# Patient Record
Sex: FEMALE | Race: WHITE | NOT HISPANIC OR LATINO | Employment: FULL TIME | URBAN - METROPOLITAN AREA
[De-identification: names, ages, dates, MRNs, and addresses within clinical notes are randomized per-mention and may not be internally consistent; named-entity substitution may affect disease eponyms.]

---

## 2019-11-19 ENCOUNTER — OFFICE VISIT (OUTPATIENT)
Dept: INTERNAL MEDICINE | Facility: CLINIC | Age: 29
End: 2019-11-19

## 2019-11-19 VITALS
BODY MASS INDEX: 21.62 KG/M2 | TEMPERATURE: 98.7 F | HEART RATE: 88 BPM | HEIGHT: 69 IN | DIASTOLIC BLOOD PRESSURE: 94 MMHG | SYSTOLIC BLOOD PRESSURE: 142 MMHG | WEIGHT: 146 LBS

## 2019-11-19 DIAGNOSIS — M50.90 CERVICAL DISC DISEASE: ICD-10-CM

## 2019-11-19 DIAGNOSIS — Z00.00 ENCOUNTER FOR MEDICAL EXAMINATION TO ESTABLISH CARE: ICD-10-CM

## 2019-11-19 DIAGNOSIS — L70.0 CYSTIC ACNE: ICD-10-CM

## 2019-11-19 DIAGNOSIS — F90.2 ATTENTION DEFICIT HYPERACTIVITY DISORDER (ADHD), COMBINED TYPE: Primary | ICD-10-CM

## 2019-11-19 DIAGNOSIS — Z79.899 LONG-TERM USE OF HIGH-RISK MEDICATION: ICD-10-CM

## 2019-11-19 PROBLEM — M40.202 CERVICAL KYPHOSIS: Status: ACTIVE | Noted: 2019-11-19

## 2019-11-19 PROBLEM — B00.9 HERPES SIMPLEX TYPE 1 INFECTION: Status: ACTIVE | Noted: 2019-11-19

## 2019-11-19 PROCEDURE — 90471 IMMUNIZATION ADMIN: CPT | Performed by: NURSE PRACTITIONER

## 2019-11-19 PROCEDURE — 99214 OFFICE O/P EST MOD 30 MIN: CPT | Performed by: NURSE PRACTITIONER

## 2019-11-19 PROCEDURE — 90674 CCIIV4 VAC NO PRSV 0.5 ML IM: CPT | Performed by: NURSE PRACTITIONER

## 2019-11-19 RX ORDER — DOXYCYCLINE HYCLATE 50 MG/1
1 CAPSULE ORAL DAILY PRN
COMMUNITY
Start: 2019-11-18 | End: 2019-11-19

## 2019-11-19 RX ORDER — SPIRONOLACTONE 100 MG/1
100 TABLET, FILM COATED ORAL DAILY
Qty: 30 TABLET | Refills: 2 | Status: SHIPPED | OUTPATIENT
Start: 2019-11-19 | End: 2020-02-19

## 2019-11-19 RX ORDER — DEXTROAMPHETAMINE SACCHARATE, AMPHETAMINE ASPARTATE MONOHYDRATE, DEXTROAMPHETAMINE SULFATE AND AMPHETAMINE SULFATE 7.5; 7.5; 7.5; 7.5 MG/1; MG/1; MG/1; MG/1
30 CAPSULE, EXTENDED RELEASE ORAL DAILY
Qty: 30 CAPSULE | Refills: 0 | Status: SHIPPED | OUTPATIENT
Start: 2019-11-19 | End: 2019-12-19 | Stop reason: SDUPTHER

## 2019-11-19 RX ORDER — BUPROPION HYDROCHLORIDE 150 MG/1
1 TABLET ORAL DAILY
COMMUNITY
Start: 2019-11-16 | End: 2020-02-26 | Stop reason: SDUPTHER

## 2019-11-19 RX ORDER — LINACLOTIDE 290 UG/1
1 CAPSULE, GELATIN COATED ORAL EVERY MORNING
COMMUNITY
Start: 2019-11-13 | End: 2019-12-23 | Stop reason: SDUPTHER

## 2019-11-19 RX ORDER — ETONOGESTREL/ETHINYL ESTRADIOL .12-.015MG
RING, VAGINAL VAGINAL
Refills: 2 | COMMUNITY
Start: 2019-10-17 | End: 2021-01-26 | Stop reason: SDUPTHER

## 2019-11-19 RX ORDER — DEXTROAMPHETAMINE SACCHARATE, AMPHETAMINE ASPARTATE MONOHYDRATE, DEXTROAMPHETAMINE SULFATE AND AMPHETAMINE SULFATE 7.5; 7.5; 7.5; 7.5 MG/1; MG/1; MG/1; MG/1
1 CAPSULE, EXTENDED RELEASE ORAL DAILY
Refills: 0 | COMMUNITY
Start: 2019-10-17 | End: 2019-11-19 | Stop reason: SDUPTHER

## 2019-11-19 RX ORDER — ACYCLOVIR 800 MG/1
1 TABLET ORAL EVERY 8 HOURS PRN
COMMUNITY
Start: 2019-11-18 | End: 2020-01-27 | Stop reason: SDUPTHER

## 2019-11-19 NOTE — PROGRESS NOTES
"Selma Rangel  1990  [id number]  Patient Care Team:  Shaila Cole APRN as PCP - General (Internal Medicine)    Selma Rangel is a 28 y.o. here today to establish care.    Chief Complaint   Patient presents with   • Establish Care     Transferring care from Dr. Taylor at [facility]        HPI:   Here to establish care.  Was seeing Dr. Taylor at [facility].  Had to change due insurance.  Has lived in [place] for about 5 years.  Grew up in [place].  While  lived in [place].    ADHD:  On adderall 30mg XR daily for years.  Only uses on work days, works full time at  [employer]. Hyperactive, has to play with silly putty when not busy.  On meds since age 15 (concerta), had side effects of nausea.  Have had some periods without when uninsured.    Past saw psych, history of alcoholic parent and depression after divorce and trouble with infidelity, was on sertraline but had side effect of low libido, the wellburtin was added and then zoloft removed.    MDD:  Divorce about a yr ago, on Wellbutrin 150mg and when inc to 300mg had irritability.  Back to 150mg this month.      Acne:  Uses doxy as spironolactone.  not used for a while, had good results and would like to restart.. Past saw Yon romano.    Chronic constipation:  Good results with Linzess.  Had normal colonoscopy about 3 yrs ago in [place].  Still has mild sx of \"colon spasm\" which is not new.  Better with Linzess.    GYN:    Last PAP Dec 2017 normal (never abnormal)  No past pregnancies.  No period early Oct.  Has boyfriend now.  NuvaRing for contraception,   Put in most recent neuvaring early.  Has not taken a home pregnancy test.    Herpes Simplex 1: prn use of zovirax    About a year ago had a work related neck sprain.  During evaluation and treatment had CT imaging with evidence of disc disease, slight kyphosis and cervical levoscoliosis.  She brings a copy of the MRI for review.  She has had chronic neck pain for years prior to the " strain which continues.  uses ibuprophen prn, multiple times a week  Past Medical History:   Diagnosis Date   • ADHD (attention deficit hyperactivity disorder) 1/23/1997    Was tested as a child because I would struggle in school   • Arthritis 11/12/2018   • Depression    • Irritable bowel syndrome      Past Surgical History:   Procedure Laterality Date   • COLONOSCOPY       Family History   Problem Relation Age of Onset   • Depression Mother    • Miscarriages / Stillbirths Mother    • Depression Sister      Social History     Tobacco Use   Smoking Status Never Smoker   Smokeless Tobacco Never Used   Tobacco Comment    Never used it. But my boyfriend does sometimes smoke around me.     No Known Allergies    Current Outpatient Medications:   •  acyclovir (ZOVIRAX) 800 MG tablet, Take 1 tablet by mouth Every 8 (Eight) Hours As Needed (cold sores)., Disp: , Rfl:   •  amphetamine-dextroamphetamine XR (ADDERALL XR) 30 MG 24 hr capsule, Take 1 capsule by mouth Daily, Disp: , Rfl: 0  •  buPROPion XL (WELLBUTRIN XL) 150 MG 24 hr tablet, Take 1 tablet by mouth Daily., Disp: , Rfl:   •  LINZESS 290 MCG capsule capsule, Take 1 capsule by mouth Every Morning., Disp: , Rfl:   •  NUVARING 0.12-0.015 MG/24HR vaginal ring, INSERT ONE RING VAGINALLY FOR THREE WEEKS THEN ONE WEEK OFF, Disp: , Rfl: 2  •  spironolactone (ALDACTONE) 100 MG tablet, Take 1 tablet by mouth Daily., Disp: 30 tablet, Rfl: 2    Review of Systems   Constitutional: Positive for fatigue. Negative for chills and fever.   HENT: Negative for congestion, ear pain and sinus pressure.    Respiratory: Negative for cough, chest tightness, shortness of breath and wheezing.    Cardiovascular: Negative for chest pain and palpitations.   Gastrointestinal: Negative for abdominal pain, blood in stool and constipation.   Musculoskeletal: Positive for neck pain.   Skin: Negative for color change.   Allergic/Immunologic: Positive for environmental allergies.   Neurological:  "Negative for dizziness, speech difficulty and headache.   Psychiatric/Behavioral: Negative for decreased concentration. The patient is not nervous/anxious.        /94 (BP Location: Left arm, Patient Position: Sitting, Cuff Size: Adult)   Pulse 88   Temp 98.7 °F (37.1 °C) (Temporal)   Ht 175 cm (68.9\")   Wt 66.2 kg (146 lb)   BMI 21.62 kg/m²     Physical Exam   Constitutional: She appears well-developed and well-nourished.   HENT:   Head: Normocephalic and atraumatic.   Right Ear: External ear normal.   Left Ear: External ear normal.   Mouth/Throat: Oropharynx is clear and moist.   Eyes: Conjunctivae and EOM are normal.   Neck: Normal range of motion. Neck supple.   Cardiovascular: Normal rate, regular rhythm and normal heart sounds.   Pulmonary/Chest: Effort normal and breath sounds normal.   Musculoskeletal: Normal range of motion.   Neurological: She is alert.   Skin: Skin is warm and dry.   Psychiatric: She has a normal mood and affect. Her behavior is normal. Thought content normal. She is inattentive.       Results Review:  None    Assessment/Plan:  Problem List Items Addressed This Visit        Musculoskeletal and Integument    Cervical disc disease    Relevant Orders    Ambulatory Referral to Pain Management    Cystic acne       Other    Attention deficit hyperactivity disorder (ADHD), combined type - Primary    Relevant Medications    buPROPion XL (WELLBUTRIN XL) 150 MG 24 hr tablet    amphetamine-dextroamphetamine XR (ADDERALL XR) 30 MG 24 hr capsule      Other Visit Diagnoses     Encounter for medical examination to establish care        Long-term use of high-risk medication        Relevant Orders    Pain Management Profile (13 Drugs) Urine - Urine, Clean Catch            Diagnosis Plan   1. Attention deficit hyperactivity disorder (ADHD), combined type      Continue Adderall 30 mg daily   2. Encounter for medical examination to establish care     3. Long-term use of high-risk medication  Pain " Management Profile (13 Drugs) Urine - Urine, Clean Catch   4. Cervical disc disease  Ambulatory Referral to Pain Management   5. Cystic acne      Sending spironolactone prescription       Patient Instructions   We will proceed with home pregnancy test this week and next.  If any positive readings return for serum pregnancy test.    Will refill Adderall.  This patient is on a controlled substance which improves symptoms/quality of life and is aware of the risks, benefits and possible side-effects current treatment. The patient denies any medication side-effects at this time. A controlled substance agreement will be obtained or is currently on file. We reviewed required monitoring for controlled substances including but not limited to quarterly follow-up visits, annual depression screening, and urine drug screens to which the patient is agreeable. A [facility]A report has been or shortly will be reviewed. There are no signs of deviation or misuse.     Referral to Dr. Winter for neck pain.    Restart spironolactone 100 mg daily for acne.    Plan of care reviewed with patient at the conclusion of today's visit. Education was provided regarding diagnosis and management.  Patient verbalizes understanding of and agreement with management plan.    Return in about 3 months (around 2/19/2020).    * Please note that portions of this note were completed with a voice recognition program. Efforts were made to edit the dictation but occasionally words are transcribed.     MARK Greco

## 2019-11-19 NOTE — PATIENT INSTRUCTIONS
We will proceed with home pregnancy test this week and next.  If any positive readings return for serum pregnancy test.    Will refill Adderall.  This patient is on a controlled substance which improves symptoms/quality of life and is aware of the risks, benefits and possible side-effects current treatment. The patient denies any medication side-effects at this time. A controlled substance agreement will be obtained or is currently on file. We reviewed required monitoring for controlled substances including but not limited to quarterly follow-up visits, annual depression screening, and urine drug screens to which the patient is agreeable. A MAYA report has been or shortly will be reviewed. There are no signs of deviation or misuse.     Referral to Dr. Winter for neck pain.    Restart spironolactone 100 mg daily for acne.

## 2019-11-27 DIAGNOSIS — Z79.899 LONG-TERM USE OF HIGH-RISK MEDICATION: ICD-10-CM

## 2019-12-19 RX ORDER — DEXTROAMPHETAMINE SACCHARATE, AMPHETAMINE ASPARTATE MONOHYDRATE, DEXTROAMPHETAMINE SULFATE AND AMPHETAMINE SULFATE 7.5; 7.5; 7.5; 7.5 MG/1; MG/1; MG/1; MG/1
30 CAPSULE, EXTENDED RELEASE ORAL DAILY
Qty: 30 CAPSULE | Refills: 0 | Status: SHIPPED | OUTPATIENT
Start: 2019-12-19 | End: 2020-01-22 | Stop reason: SDUPTHER

## 2020-01-22 DIAGNOSIS — F90.2 ATTENTION DEFICIT HYPERACTIVITY DISORDER (ADHD), COMBINED TYPE: Primary | ICD-10-CM

## 2020-01-22 RX ORDER — LINACLOTIDE 290 UG/1
290 CAPSULE, GELATIN COATED ORAL EVERY MORNING
Qty: 30 CAPSULE | Refills: 5 | OUTPATIENT
Start: 2020-01-22

## 2020-01-22 RX ORDER — DEXTROAMPHETAMINE SACCHARATE, AMPHETAMINE ASPARTATE MONOHYDRATE, DEXTROAMPHETAMINE SULFATE AND AMPHETAMINE SULFATE 7.5; 7.5; 7.5; 7.5 MG/1; MG/1; MG/1; MG/1
30 CAPSULE, EXTENDED RELEASE ORAL DAILY
Qty: 30 CAPSULE | Refills: 0 | OUTPATIENT
Start: 2020-01-22

## 2020-01-22 RX ORDER — DEXTROAMPHETAMINE SACCHARATE, AMPHETAMINE ASPARTATE MONOHYDRATE, DEXTROAMPHETAMINE SULFATE AND AMPHETAMINE SULFATE 7.5; 7.5; 7.5; 7.5 MG/1; MG/1; MG/1; MG/1
30 CAPSULE, EXTENDED RELEASE ORAL DAILY
Qty: 30 CAPSULE | Refills: 0 | Status: SHIPPED | OUTPATIENT
Start: 2020-01-22 | End: 2020-02-26 | Stop reason: SDUPTHER

## 2020-01-22 NOTE — TELEPHONE ENCOUNTER
Last seen                                                                                                                                                                                                                                                                                                                                                                                                                                                                                           Last seen 11/19, next appt 2/17, last filled 12/19, #30, figueroa current

## 2020-02-19 RX ORDER — SPIRONOLACTONE 100 MG/1
100 TABLET, FILM COATED ORAL DAILY
Qty: 30 TABLET | Refills: 2 | Status: SHIPPED | OUTPATIENT
Start: 2020-02-19 | End: 2020-09-21 | Stop reason: SDUPTHER

## 2020-02-25 ENCOUNTER — TELEPHONE (OUTPATIENT)
Dept: INTERNAL MEDICINE | Facility: CLINIC | Age: 30
End: 2020-02-25

## 2020-02-25 DIAGNOSIS — F90.2 ATTENTION DEFICIT HYPERACTIVITY DISORDER (ADHD), COMBINED TYPE: ICD-10-CM

## 2020-02-25 RX ORDER — DEXTROAMPHETAMINE SACCHARATE, AMPHETAMINE ASPARTATE MONOHYDRATE, DEXTROAMPHETAMINE SULFATE AND AMPHETAMINE SULFATE 7.5; 7.5; 7.5; 7.5 MG/1; MG/1; MG/1; MG/1
30 CAPSULE, EXTENDED RELEASE ORAL DAILY
Qty: 30 CAPSULE | Refills: 0 | OUTPATIENT
Start: 2020-02-25

## 2020-02-25 NOTE — TELEPHONE ENCOUNTER
[facility] Patient-Entered Hpi Selection Questionnaire     Question 2/25/2020 10:29 AM EST   What is the primary reason for your visit? Other   Please describe your symptoms. Checking in. The last bill for my blood test was $300. And I don’t have extra money for that! So if i can find a cheaper option? Also, id like to try Venlafaxine. I have alot of the same depression and anxiety problems she has and I am wondering if because we are related it would effect me the same. The welbutron isnt working for me. Also, my body is in pain constantly and i never saw anyone for my neck due to budgeting issues.   Have you had these symptoms before? Yes   How long have you been having these symptoms? For more than a week   Please list any medications you are currently taking for this condition. They should all be in [facility] system. I dont feel like typing them out.   Please describe any probable cause for these symptoms.  I keep crying and i get social anxiety and its been causing issues between my boyfriend and i bc i am just not happy at all   Message     This message was automatically generated when an appointment dated 2/26/2020 was cancelled.      The cancelled appointment contained the following questionnaire data:      Patient Questionnaire Submission   --------------------------------      Questionnaire: Primary Reason for Visit      Question: What is the primary reason for your visit?   Answer:   Other      Questionnaire: Other      Question: Please describe your symptoms.   Answer:   Checking in. The last bill for my blood test was $300. And I don’t have extra money for that! So if i can find a cheaper option? Also, id like to try Venlafaxine. I have alot of the same depression and anxiety problems she has and I am wondering if because we are related it would effect me the same. The welbutron isnt working for me. Also, my body is in pain constantly and i never saw anyone for my neck due to budgeting issues.      Question:  Have you had these symptoms before?   Answer:   Yes      Question: How long have you been having these symptoms?   Answer:   For more than a week      Question: Please list any medications you are currently taking for this condition.   Answer:   They should all be in [facility] system. I dont feel like typing them out.      Question: Please describe any probable cause for these symptoms.    Answer:   I keep crying and i get social anxiety and its been causing issues between my boyfriend and i bc i am just not happy at all

## 2020-02-25 NOTE — TELEPHONE ENCOUNTER
Last filled: 1/22/20  Last seen:11/19/19  Next appointment: not scheduled (canceled tomorrows appointment)  MAYA:pending

## 2020-02-26 ENCOUNTER — OFFICE VISIT (OUTPATIENT)
Dept: INTERNAL MEDICINE | Facility: CLINIC | Age: 30
End: 2020-02-26

## 2020-02-26 VITALS
HEART RATE: 76 BPM | WEIGHT: 148 LBS | DIASTOLIC BLOOD PRESSURE: 92 MMHG | HEIGHT: 69 IN | TEMPERATURE: 98.7 F | SYSTOLIC BLOOD PRESSURE: 126 MMHG | BODY MASS INDEX: 21.92 KG/M2

## 2020-02-26 DIAGNOSIS — F90.2 ATTENTION DEFICIT HYPERACTIVITY DISORDER (ADHD), COMBINED TYPE: ICD-10-CM

## 2020-02-26 DIAGNOSIS — F33.1 MODERATE EPISODE OF RECURRENT MAJOR DEPRESSIVE DISORDER (HCC): Primary | ICD-10-CM

## 2020-02-26 PROCEDURE — 99214 OFFICE O/P EST MOD 30 MIN: CPT | Performed by: NURSE PRACTITIONER

## 2020-02-26 RX ORDER — BUPROPION HYDROCHLORIDE 150 MG/1
150 TABLET ORAL DAILY
Qty: 90 TABLET | Refills: 0 | Status: SHIPPED | OUTPATIENT
Start: 2020-02-26 | End: 2020-12-18 | Stop reason: SDUPTHER

## 2020-02-26 RX ORDER — VENLAFAXINE HYDROCHLORIDE 37.5 MG/1
37.5 CAPSULE, EXTENDED RELEASE ORAL DAILY
Qty: 90 CAPSULE | Refills: 0 | Status: SHIPPED | OUTPATIENT
Start: 2020-02-26 | End: 2020-05-22

## 2020-02-26 RX ORDER — DEXTROAMPHETAMINE SACCHARATE, AMPHETAMINE ASPARTATE MONOHYDRATE, DEXTROAMPHETAMINE SULFATE AND AMPHETAMINE SULFATE 7.5; 7.5; 7.5; 7.5 MG/1; MG/1; MG/1; MG/1
30 CAPSULE, EXTENDED RELEASE ORAL DAILY
Qty: 30 CAPSULE | Refills: 0 | Status: SHIPPED | OUTPATIENT
Start: 2020-02-26 | End: 2020-03-21 | Stop reason: SDUPTHER

## 2020-02-26 NOTE — PROGRESS NOTES
Selma Rangel  1990  [id number]  Patient Care Team:  Shaila Cole APRN as PCP - General (Internal Medicine)    Selma Rangel is a pleasant 29 y.o. female who presents for evaluation of ADHD (3 month follow up ) and Depression      Chief Complaint   Patient presents with   • ADHD     3 month follow up    • Depression       HPI:   More depression past few months.  Beginning to affect her relationship with her boyfriend. Crying more and higher anxiety.  Friends at work on noticing.  She has more social anxiety recently.  She has previously done well on Wellbutrin 300 mg and is not sure if she should go back up or we should trial another medication.  Her sister has similar symptoms and uses venlafaxine which she has considered.  The only past medication she is used are Wellbutrin and Zoloft.  Zoloft was effective but she had sexual side effects.    ADHD: Doing well on Adderall for years, this helps with focus and attention.  No side effects.  Past Medical History:   Diagnosis Date   • ADHD (attention deficit hyperactivity disorder) 1/23/1997    Was tested as a child because I would struggle in school   • Arthritis 11/12/2018   • Depression    • Irritable bowel syndrome      Past Surgical History:   Procedure Laterality Date   • COLONOSCOPY       Family History   Problem Relation Age of Onset   • Depression Mother    • Miscarriages / Stillbirths Mother    • Depression Sister      Social History     Tobacco Use   Smoking Status Never Smoker   Smokeless Tobacco Never Used   Tobacco Comment    Never used it. But my boyfriend does sometimes smoke around me.     No Known Allergies    Current Outpatient Medications:   •  acyclovir (ZOVIRAX) 800 MG tablet, Take 1 tablet by mouth 3 (Three) Times a Day. (Patient taking differently: Take 800 mg by mouth 3 (Three) Times a Day As Needed.), Disp: 15 tablet, Rfl: 5  •  buPROPion XL (WELLBUTRIN XL) 150 MG 24 hr tablet, Take 1 tablet by mouth Daily., Disp: 90 tablet, Rfl:  "0  •  LINZESS 290 MCG capsule capsule, Take 1 capsule by mouth Every Morning., Disp: 30 capsule, Rfl: 5  •  NUVARING 0.12-0.015 MG/24HR vaginal ring, INSERT ONE RING VAGINALLY FOR THREE WEEKS THEN ONE WEEK OFF, Disp: , Rfl: 2  •  spironolactone (ALDACTONE) 100 MG tablet, TAKE 1 TABLET BY MOUTH DAILY, Disp: 30 tablet, Rfl: 2  •  amphetamine-dextroamphetamine XR (ADDERALL XR) 30 MG 24 hr capsule, Take 1 capsule by mouth Daily, Disp: 30 capsule, Rfl: 0  •  venlafaxine XR (EFFEXOR-XR) 37.5 MG 24 hr capsule, Take 1 capsule by mouth Daily., Disp: 90 capsule, Rfl: 0    Review of Systems   Constitutional: Negative for chills, fatigue and fever.   HENT: Negative for congestion, ear pain and sinus pressure.    Respiratory: Negative for cough, chest tightness, shortness of breath and wheezing.    Cardiovascular: Negative for chest pain and palpitations.   Gastrointestinal: Negative for abdominal pain, blood in stool and constipation.   Skin: Negative for color change.   Allergic/Immunologic: Negative for environmental allergies.   Neurological: Negative for dizziness, speech difficulty and headache.   Psychiatric/Behavioral: Negative for decreased concentration. The patient is not nervous/anxious.      /92 (BP Location: Right arm, Patient Position: Sitting, Cuff Size: Adult)   Pulse 76   Temp 98.7 °F (37.1 °C) (Temporal)   Ht 175 cm (68.9\")   Wt 67.1 kg (148 lb)   BMI 21.92 kg/m²     Physical Exam   Constitutional: She appears well-developed and well-nourished.   HENT:   Head: Normocephalic and atraumatic.   Right Ear: External ear normal.   Left Ear: External ear normal.   Mouth/Throat: Oropharynx is clear and moist.   Eyes: Conjunctivae and EOM are normal.   Neck: Normal range of motion. Neck supple.   Cardiovascular: Normal rate, regular rhythm and normal heart sounds.   Pulmonary/Chest: Effort normal and breath sounds normal.   Musculoskeletal: Normal range of motion.   Neurological: She is alert.   Skin: Skin " is warm and dry.   Psychiatric: Her speech is normal and behavior is normal. Judgment and thought content normal. Cognition and memory are normal. She exhibits a depressed mood.   Denies homicidal or suicidal ideation       Results Review:  None    Assessment/Plan:  Selma was seen today for adhd and depression.    Diagnoses and all orders for this visit:    Moderate episode of recurrent major depressive disorder (CMS/[facility])  Comments:  Continue Wellbutrin 150 mg daily and adding venlafaxine 37.5 mg daily.  Follow-up in 6 weeks    Attention deficit hyperactivity disorder (ADHD), combined type  Comments:  Continue Adderall    Other orders  -     venlafaxine XR (EFFEXOR-XR) 37.5 MG 24 hr capsule; Take 1 capsule by mouth Daily.  -     buPROPion XL (WELLBUTRIN XL) 150 MG 24 hr tablet; Take 1 tablet by mouth Daily.       Patient Instructions   Continue Wellbutrin 150 mg daily and adding venlafaxine 37.5 mg daily it may take 4-6 weeks to notice improvement.  If experience increased depression or develop suicidal thoughts contact me immediately at  my office.  This medication she not be stopped suddenly.      For ADHD, continue Adderall daily.  This patient is on a controlled substance which improves symptoms/quality of life and is aware of the risks, benefits and possible side-effects current treatment. The patient denies any medication side-effects at this time. A controlled substance agreement will be obtained or is currently on file. We reviewed required monitoring for controlled substances including but not limited to quarterly follow-up visits, annual depression screening, and urine drug screens to which the patient is agreeable. A [facility]A report has been or shortly will be reviewed. There are no signs of deviation or misuse.         Plan of care reviewed with patient at the conclusion of today's visit. Education was provided regarding diagnosis, management and any prescribed or recommended OTC medications.  Patient  verbalizes understanding of and agreement with management plan.    Return in about 6 weeks (around 4/8/2020).    *Note that portions of this note were completed with a voice recognition program.  Efforts were made to edit the dictation but occasionally words are transcribed.    MARK Greco

## 2020-02-26 NOTE — TELEPHONE ENCOUNTER
Patient is new to this practice.  First appointment was with Jodi 11/2019.  She must come to her visit every 3 months in order to get refills we establish that she is reliable and prompt.  Then we may be able to go to every 6 months in the future

## 2020-02-27 PROBLEM — F33.9 EPISODE OF RECURRENT MAJOR DEPRESSIVE DISORDER (HCC): Status: ACTIVE | Noted: 2020-02-27

## 2020-02-28 NOTE — PATIENT INSTRUCTIONS
Continue Wellbutrin 150 mg daily and adding venlafaxine 37.5 mg daily it may take 4-6 weeks to notice improvement.  If experience increased depression or develop suicidal thoughts contact me immediately at  my office.  This medication she not be stopped suddenly.      For ADHD, continue Adderall daily.  This patient is on a controlled substance which improves symptoms/quality of life and is aware of the risks, benefits and possible side-effects current treatment. The patient denies any medication side-effects at this time. A controlled substance agreement will be obtained or is currently on file. We reviewed required monitoring for controlled substances including but not limited to quarterly follow-up visits, annual depression screening, and urine drug screens to which the patient is agreeable. A [facility] report has been or shortly will be reviewed. There are no signs of deviation or misuse.

## 2020-03-21 DIAGNOSIS — F90.2 ATTENTION DEFICIT HYPERACTIVITY DISORDER (ADHD), COMBINED TYPE: ICD-10-CM

## 2020-03-23 RX ORDER — DEXTROAMPHETAMINE SACCHARATE, AMPHETAMINE ASPARTATE MONOHYDRATE, DEXTROAMPHETAMINE SULFATE AND AMPHETAMINE SULFATE 7.5; 7.5; 7.5; 7.5 MG/1; MG/1; MG/1; MG/1
30 CAPSULE, EXTENDED RELEASE ORAL DAILY
Qty: 30 CAPSULE | Refills: 0 | Status: SHIPPED | OUTPATIENT
Start: 2020-03-23 | End: 2020-04-27 | Stop reason: SDUPTHER

## 2020-03-29 ENCOUNTER — E-VISIT (OUTPATIENT)
Dept: INTERNAL MEDICINE | Facility: CLINIC | Age: 30
End: 2020-03-29

## 2020-03-29 DIAGNOSIS — Z20.822 EXPOSURE TO COVID-19 VIRUS: Primary | ICD-10-CM

## 2020-03-29 PROCEDURE — 99421 OL DIG E/M SVC 5-10 MIN: CPT | Performed by: NURSE PRACTITIONER

## 2020-03-30 NOTE — PROGRESS NOTES
Selma Rangel    1990  [id number]    I have reviewed the e-Visit questionnaire and patient's answers, my assessment and plan are as follows:    HPI    Review of Systems - History obtained from chart review and the patient      Diagnoses and all orders for this visit:    Exposure to Covid-19 Virus  Comments:  self quarantine x 14 days, f/u if worse sx, initiate daily f/u questionaire   Orders:  -     QUESTIONNAIRE SERIES        Any medications prescribed have been sent electronically to   [id number] - [place], [place] - [address] AT [facility] [address] - [phone]  - [phone] FX  [address]  [facility] [address]  Phone: [phone] Fax: [phone]        MARK Greco  03/30/20  12:49 PM

## 2020-04-08 ENCOUNTER — PATIENT MESSAGE (OUTPATIENT)
Dept: INTERNAL MEDICINE | Facility: CLINIC | Age: 30
End: 2020-04-08

## 2020-04-08 RX ORDER — POLYETHYLENE GLYCOL 3350 17 G/17G
17 POWDER, FOR SOLUTION ORAL DAILY
Qty: 289 G | Refills: 2 | Status: SHIPPED | OUTPATIENT
Start: 2020-04-08 | End: 2020-08-04

## 2020-04-08 NOTE — TELEPHONE ENCOUNTER
From: Selma Rangel  To: Shaila Cole APRN  Sent: 4/8/2020 1:14 PM EDT  Subject: Prescription Question    I used to get MiraLAX prescribed by my old dr Jose it was cheaper then buying the name brand bottle. Is there anyway to get that prescribed from y'all? I'm noticing maybe Bc I'm eating more and being at home I'm struggling with my constipation.

## 2020-04-25 DIAGNOSIS — F90.2 ATTENTION DEFICIT HYPERACTIVITY DISORDER (ADHD), COMBINED TYPE: ICD-10-CM

## 2020-04-27 ENCOUNTER — TELEPHONE (OUTPATIENT)
Dept: INTERNAL MEDICINE | Facility: CLINIC | Age: 30
End: 2020-04-27

## 2020-04-27 DIAGNOSIS — F90.2 ATTENTION DEFICIT HYPERACTIVITY DISORDER (ADHD), COMBINED TYPE: ICD-10-CM

## 2020-04-27 RX ORDER — DEXTROAMPHETAMINE SACCHARATE, AMPHETAMINE ASPARTATE MONOHYDRATE, DEXTROAMPHETAMINE SULFATE AND AMPHETAMINE SULFATE 7.5; 7.5; 7.5; 7.5 MG/1; MG/1; MG/1; MG/1
30 CAPSULE, EXTENDED RELEASE ORAL DAILY
Qty: 30 CAPSULE | Refills: 0 | Status: SHIPPED | OUTPATIENT
Start: 2020-04-27 | End: 2020-06-03 | Stop reason: SDUPTHER

## 2020-04-27 RX ORDER — DEXTROAMPHETAMINE SACCHARATE, AMPHETAMINE ASPARTATE MONOHYDRATE, DEXTROAMPHETAMINE SULFATE AND AMPHETAMINE SULFATE 7.5; 7.5; 7.5; 7.5 MG/1; MG/1; MG/1; MG/1
30 CAPSULE, EXTENDED RELEASE ORAL DAILY
Qty: 30 CAPSULE | Refills: 0 | OUTPATIENT
Start: 2020-04-27

## 2020-04-27 NOTE — TELEPHONE ENCOUNTER
"The following was copied from pts appt comments made when she scheduled an appt via [facility], for May 4th Robin.  FYI        \"I ended up having to work during my last appointment and completely forgot. I originally have Monday’s off and that’s why I made it for a Monday. But things have been changing and been weird at work.  I’m loving my anti depressant though!  I notice a HUGE difference.\"  "

## 2020-05-22 RX ORDER — VENLAFAXINE HYDROCHLORIDE 37.5 MG/1
37.5 CAPSULE, EXTENDED RELEASE ORAL DAILY
Qty: 90 CAPSULE | Refills: 0 | Status: SHIPPED | OUTPATIENT
Start: 2020-05-22 | End: 2020-07-06 | Stop reason: SDUPTHER

## 2020-06-03 DIAGNOSIS — F90.2 ATTENTION DEFICIT HYPERACTIVITY DISORDER (ADHD), COMBINED TYPE: ICD-10-CM

## 2020-06-03 RX ORDER — DEXTROAMPHETAMINE SACCHARATE, AMPHETAMINE ASPARTATE MONOHYDRATE, DEXTROAMPHETAMINE SULFATE AND AMPHETAMINE SULFATE 7.5; 7.5; 7.5; 7.5 MG/1; MG/1; MG/1; MG/1
30 CAPSULE, EXTENDED RELEASE ORAL DAILY
Qty: 30 CAPSULE | Refills: 0 | Status: SHIPPED | OUTPATIENT
Start: 2020-06-03 | End: 2020-07-06 | Stop reason: SDUPTHER

## 2020-07-06 DIAGNOSIS — F90.2 ATTENTION DEFICIT HYPERACTIVITY DISORDER (ADHD), COMBINED TYPE: ICD-10-CM

## 2020-07-06 RX ORDER — VENLAFAXINE HYDROCHLORIDE 37.5 MG/1
37.5 CAPSULE, EXTENDED RELEASE ORAL DAILY
Qty: 90 CAPSULE | Refills: 0 | Status: SHIPPED | OUTPATIENT
Start: 2020-07-06 | End: 2020-07-08 | Stop reason: SDUPTHER

## 2020-07-06 RX ORDER — DEXTROAMPHETAMINE SACCHARATE, AMPHETAMINE ASPARTATE MONOHYDRATE, DEXTROAMPHETAMINE SULFATE AND AMPHETAMINE SULFATE 7.5; 7.5; 7.5; 7.5 MG/1; MG/1; MG/1; MG/1
30 CAPSULE, EXTENDED RELEASE ORAL DAILY
Qty: 30 CAPSULE | Refills: 0 | Status: SHIPPED | OUTPATIENT
Start: 2020-07-06 | End: 2020-08-04 | Stop reason: SDUPTHER

## 2020-07-06 NOTE — TELEPHONE ENCOUNTER
She needs to be seen this mo, ok to do video visit if that is easier for her.  Will refill this mo

## 2020-07-06 NOTE — TELEPHONE ENCOUNTER
Last Seen:  2/26/20    Follow Up:   none    Last approved:      6/3/20        Qty:  30    Refills:  0    Saulo:     Current  6-3-20                         Req#

## 2020-07-08 RX ORDER — VENLAFAXINE HYDROCHLORIDE 37.5 MG/1
37.5 CAPSULE, EXTENDED RELEASE ORAL DAILY
Qty: 90 CAPSULE | Refills: 1 | Status: SHIPPED | OUTPATIENT
Start: 2020-07-08 | End: 2020-08-31

## 2020-08-04 ENCOUNTER — OFFICE VISIT (OUTPATIENT)
Dept: INTERNAL MEDICINE | Facility: CLINIC | Age: 30
End: 2020-08-04

## 2020-08-04 VITALS
TEMPERATURE: 96.9 F | BODY MASS INDEX: 23.25 KG/M2 | SYSTOLIC BLOOD PRESSURE: 126 MMHG | WEIGHT: 157 LBS | HEIGHT: 69 IN | DIASTOLIC BLOOD PRESSURE: 82 MMHG | HEART RATE: 96 BPM

## 2020-08-04 DIAGNOSIS — F90.2 ATTENTION DEFICIT HYPERACTIVITY DISORDER (ADHD), COMBINED TYPE: ICD-10-CM

## 2020-08-04 DIAGNOSIS — F90.2 ATTENTION DEFICIT HYPERACTIVITY DISORDER (ADHD), COMBINED TYPE: Primary | ICD-10-CM

## 2020-08-04 DIAGNOSIS — F33.1 MODERATE EPISODE OF RECURRENT MAJOR DEPRESSIVE DISORDER (HCC): ICD-10-CM

## 2020-08-04 DIAGNOSIS — K59.09 CHRONIC CONSTIPATION: ICD-10-CM

## 2020-08-04 PROCEDURE — 99214 OFFICE O/P EST MOD 30 MIN: CPT | Performed by: NURSE PRACTITIONER

## 2020-08-04 RX ORDER — DEXTROAMPHETAMINE SACCHARATE, AMPHETAMINE ASPARTATE MONOHYDRATE, DEXTROAMPHETAMINE SULFATE AND AMPHETAMINE SULFATE 7.5; 7.5; 7.5; 7.5 MG/1; MG/1; MG/1; MG/1
30 CAPSULE, EXTENDED RELEASE ORAL DAILY
Qty: 30 CAPSULE | Refills: 0 | Status: SHIPPED | OUTPATIENT
Start: 2020-08-04 | End: 2020-09-03 | Stop reason: SDUPTHER

## 2020-08-04 RX ORDER — ACYCLOVIR 800 MG/1
800 TABLET ORAL 3 TIMES DAILY PRN
Start: 2020-08-04 | End: 2020-11-30 | Stop reason: SDUPTHER

## 2020-08-04 NOTE — PROGRESS NOTES
"Selma Rangel  1990  [id number]  Patient Care Team:  Shaila Cole APRN as PCP - General (Internal Medicine)    Selma Rangel is a pleasant 29 y.o. female who presents for evaluation of ADHD (Med refill) and Depression    Chief Complaint   Patient presents with   • ADHD     Med refill   • Depression       HPI:   ADHD:  Routine compliance visit for adderall refill.  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  Uses on work days 6/7 days of week.    Depression:  Feeling great after starting on effexor 37.5  \"best\" SSRI she has tried. Less crying.  alos on wellbutrine    chronic constipation:  miralax and linzess prn, not daily, still struggles with constipation.  Has to take linzess on empty stomach which is hard with her work scheduled.    Chronic constipation  Past Medical History:   Diagnosis Date   • ADHD (attention deficit hyperactivity disorder) 1/23/1997    Was tested as a child because I would struggle in school   • Arthritis 11/12/2018   • Depression    • Irritable bowel syndrome      Past Surgical History:   Procedure Laterality Date   • COLONOSCOPY       Family History   Problem Relation Age of Onset   • Depression Mother    • Miscarriages / Stillbirths Mother    • Depression Sister      Social History     Tobacco Use   Smoking Status Never Smoker   Smokeless Tobacco Never Used   Tobacco Comment    Never used it. But my boyfriend does sometimes smoke around me.     No Known Allergies    Current Outpatient Medications:   •  acyclovir (ZOVIRAX) 800 MG tablet, Take 1 tablet by mouth 3 (Three) Times a Day As Needed (outbreak)., Disp: , Rfl:   •  amphetamine-dextroamphetamine XR (ADDERALL XR) 30 MG 24 hr capsule, Take 1 capsule by mouth Daily, Disp: 30 capsule, Rfl: 0  •  buPROPion XL (WELLBUTRIN XL) 150 MG 24 hr tablet, Take 1 tablet by mouth Daily., Disp: 90 tablet, Rfl: 0  •  NUVARING 0.12-0.015 MG/24HR vaginal ring, INSERT ONE RING VAGINALLY FOR THREE WEEKS THEN ONE WEEK OFF, Disp: , " "Rfl: 2  •  spironolactone (ALDACTONE) 100 MG tablet, TAKE 1 TABLET BY MOUTH DAILY, Disp: 30 tablet, Rfl: 2  •  venlafaxine XR (EFFEXOR-XR) 37.5 MG 24 hr capsule, Take 1 capsule by mouth Daily., Disp: 90 capsule, Rfl: 1  •  Plecanatide (Trulance) 3 MG tablet, Take 1 tablet by mouth Daily. Indications: Constipation caused by Irritable Bowel Syndrome, Disp: 30 tablet, Rfl: 5    Review of Systems   Constitutional: Negative for chills, fatigue and fever.   HENT: Negative for congestion, ear pain and sinus pressure.    Respiratory: Negative for cough, chest tightness, shortness of breath and wheezing.    Cardiovascular: Negative for chest pain and palpitations.   Gastrointestinal: Negative for abdominal pain, blood in stool and constipation.   Skin: Negative for color change.   Allergic/Immunologic: Negative for environmental allergies.   Neurological: Negative for dizziness, speech difficulty and headache.   Psychiatric/Behavioral: Negative for decreased concentration. The patient is not nervous/anxious.      /82 (BP Location: Left arm, Patient Position: Sitting, Cuff Size: Adult)   Pulse 96   Temp 96.9 °F (36.1 °C) (Temporal)   Ht 175 cm (68.9\")   Wt 71.2 kg (157 lb)   BMI 23.25 kg/m²     Physical Exam   Constitutional: She appears well-developed and well-nourished.   Pulmonary/Chest: Effort normal.   Neurological: She is alert.   Skin: Skin is warm and dry.   Psychiatric: She has a normal mood and affect. Her speech is normal and behavior is normal. Judgment and thought content normal. Her mood appears not anxious. Cognition and memory are normal. She does not exhibit a depressed mood. She is inattentive.   Vitals reviewed.      Results Review:  None    Assessment/Plan:  Selma was seen today for adhd and depression.    Diagnoses and all orders for this visit:    Attention deficit hyperactivity disorder (ADHD), combined type  Comments:  continue adderall    Moderate episode of recurrent major depressive " disorder (CMS/[facility])  Comments:  continue effexor    Chronic constipation  Comments:  stop Linzezz and miralax and stool softner, try Trulance daily, increase water and fiber    Other orders  -     acyclovir (ZOVIRAX) 800 MG tablet; Take 1 tablet by mouth 3 (Three) Times a Day As Needed (outbreak).  -     Plecanatide (Trulance) 3 MG tablet; Take 1 tablet by mouth Daily. Indications: Constipation caused by Irritable Bowel Syndrome       Patient Instructions   This patient is on a controlled substance which improves symptoms/quality of life and is aware of the risks, benefits and possible side-effects current treatment. The patient denies any medication side-effects at this time. A controlled substance agreement will be obtained or is currently on file. We reviewed required monitoring for controlled substances including but not limited to quarterly follow-up visits, annual depression screening, and urine drug screens to which the patient is agreeable. A MAYA report has been or shortly will be reviewed. There are no signs of deviation or misuse.       Constipation, Adult  Constipation is when a person has fewer bowel movements in a week than normal, has difficulty having a bowel movement, or has stools that are dry, hard, or larger than normal. Constipation may be caused by an underlying condition. It may become worse with age if a person takes certain medicines and does not take in enough fluids.  Follow these instructions at home:  Eating and drinking    · Eat foods that have a lot of fiber, such as fresh fruits and vegetables, whole grains, and beans.  · Limit foods that are high in fat, low in fiber, or overly processed, such as french fries, hamburgers, cookies, candies, and soda.  · Drink enough fluid to keep your urine clear or pale yellow.  General instructions  · Exercise regularly or as told by your health care provider.  · Go to the restroom when you have the urge to go. Do not hold it in.  · Take  over-the-counter and prescription medicines only as told by your health care provider. These include any fiber supplements.  · Practice pelvic floor retraining exercises, such as deep breathing while relaxing the lower abdomen and pelvic floor relaxation during bowel movements.  · Watch your condition for any changes.  · Keep all follow-up visits as told by your health care provider. This is important.  Contact a health care provider if:  · You have pain that gets worse.  · You have a fever.  · You do not have a bowel movement after 4 days.  · You vomit.  · You are not hungry.  · You lose weight.  · You are bleeding from the anus.  · You have thin, pencil-like stools.  Get help right away if:  · You have a fever and your symptoms suddenly get worse.  · You leak stool or have blood in your stool.  · Your abdomen is bloated.  · You have severe pain in your abdomen.  · You feel dizzy or you faint.  This information is not intended to replace advice given to you by your health care provider. Make sure you discuss any questions you have with your health care provider.  Document Released: 09/15/2005 Document Revised: 11/30/2018 Document Reviewed: 06/07/2017  [employer] Patient Education © 2020 [employer]      Plan of care reviewed with patient at the conclusion of today's visit. Education was provided regarding diagnosis, management and any prescribed or recommended OTC medications.  Patient verbalizes understanding of and agreement with management plan.    Return in about 3 months (around 11/4/2020).    *Note that portions of this note were completed with a voice recognition program.  Efforts were made to edit the dictation but occasionally words are transcribed.    MARK Greco

## 2020-08-04 NOTE — PATIENT INSTRUCTIONS
This patient is on a controlled substance which improves symptoms/quality of life and is aware of the risks, benefits and possible side-effects current treatment. The patient denies any medication side-effects at this time. A controlled substance agreement will be obtained or is currently on file. We reviewed required monitoring for controlled substances including but not limited to quarterly follow-up visits, annual depression screening, and urine drug screens to which the patient is agreeable. A MAYA report has been or shortly will be reviewed. There are no signs of deviation or misuse.       Constipation, Adult  Constipation is when a person has fewer bowel movements in a week than normal, has difficulty having a bowel movement, or has stools that are dry, hard, or larger than normal. Constipation may be caused by an underlying condition. It may become worse with age if a person takes certain medicines and does not take in enough fluids.  Follow these instructions at home:  Eating and drinking    · Eat foods that have a lot of fiber, such as fresh fruits and vegetables, whole grains, and beans.  · Limit foods that are high in fat, low in fiber, or overly processed, such as french fries, hamburgers, cookies, candies, and soda.  · Drink enough fluid to keep your urine clear or pale yellow.  General instructions  · Exercise regularly or as told by your health care provider.  · Go to the restroom when you have the urge to go. Do not hold it in.  · Take over-the-counter and prescription medicines only as told by your health care provider. These include any fiber supplements.  · Practice pelvic floor retraining exercises, such as deep breathing while relaxing the lower abdomen and pelvic floor relaxation during bowel movements.  · Watch your condition for any changes.  · Keep all follow-up visits as told by your health care provider. This is important.  Contact a health care provider if:  · You have pain that gets  worse.  · You have a fever.  · You do not have a bowel movement after 4 days.  · You vomit.  · You are not hungry.  · You lose weight.  · You are bleeding from the anus.  · You have thin, pencil-like stools.  Get help right away if:  · You have a fever and your symptoms suddenly get worse.  · You leak stool or have blood in your stool.  · Your abdomen is bloated.  · You have severe pain in your abdomen.  · You feel dizzy or you faint.  This information is not intended to replace advice given to you by your health care provider. Make sure you discuss any questions you have with your health care provider.  Document Released: 09/15/2005 Document Revised: 11/30/2018 Document Reviewed: 06/07/2017  [employer]vier Patient Education © 2020 [employer]

## 2020-08-17 ENCOUNTER — TELEPHONE (OUTPATIENT)
Dept: INTERNAL MEDICINE | Facility: CLINIC | Age: 30
End: 2020-08-17

## 2020-08-17 NOTE — TELEPHONE ENCOUNTER
I received a prior Auth from [facility] on the true Douglas 3 mg tablet daily.  (I sent in the prescription after she called and said her samples were working).  It does not say what they will cover.    I will let you decide from here what to prescribe.

## 2020-08-18 NOTE — TELEPHONE ENCOUNTER
Called the pharmacy to see if they patient ever had trouble picking up since we never received a PA for trulance. Trulance does require a PA. They will fax over form.

## 2020-08-20 ENCOUNTER — TELEPHONE (OUTPATIENT)
Dept: INTERNAL MEDICINE | Facility: CLINIC | Age: 30
End: 2020-08-20

## 2020-08-20 NOTE — TELEPHONE ENCOUNTER
PA approved. This approval is valid from 08/20/2020 to 12/31/9999, and the number of refills is 999. Pharmacy notified.

## 2020-08-31 RX ORDER — VENLAFAXINE HYDROCHLORIDE 37.5 MG/1
37.5 CAPSULE, EXTENDED RELEASE ORAL DAILY
Qty: 90 CAPSULE | Refills: 1 | Status: SHIPPED | OUTPATIENT
Start: 2020-08-31 | End: 2020-12-18 | Stop reason: SDUPTHER

## 2020-09-03 DIAGNOSIS — F90.2 ATTENTION DEFICIT HYPERACTIVITY DISORDER (ADHD), COMBINED TYPE: ICD-10-CM

## 2020-09-03 RX ORDER — DEXTROAMPHETAMINE SACCHARATE, AMPHETAMINE ASPARTATE MONOHYDRATE, DEXTROAMPHETAMINE SULFATE AND AMPHETAMINE SULFATE 7.5; 7.5; 7.5; 7.5 MG/1; MG/1; MG/1; MG/1
30 CAPSULE, EXTENDED RELEASE ORAL DAILY
Qty: 30 CAPSULE | Refills: 0 | Status: SHIPPED | OUTPATIENT
Start: 2020-09-03 | End: 2020-10-05 | Stop reason: SDUPTHER

## 2020-09-03 NOTE — TELEPHONE ENCOUNTER
Last refill:  8/4/20 #30/0  Last office visit:  8/4/20  Next office visit:  11/4/20  Saulo: current dated 8/5/20

## 2020-09-21 RX ORDER — SPIRONOLACTONE 100 MG/1
100 TABLET, FILM COATED ORAL DAILY
Qty: 30 TABLET | Refills: 2 | Status: SHIPPED | OUTPATIENT
Start: 2020-09-21 | End: 2021-03-16 | Stop reason: SDUPTHER

## 2020-10-05 DIAGNOSIS — F90.2 ATTENTION DEFICIT HYPERACTIVITY DISORDER (ADHD), COMBINED TYPE: ICD-10-CM

## 2020-10-05 RX ORDER — DEXTROAMPHETAMINE SACCHARATE, AMPHETAMINE ASPARTATE MONOHYDRATE, DEXTROAMPHETAMINE SULFATE AND AMPHETAMINE SULFATE 7.5; 7.5; 7.5; 7.5 MG/1; MG/1; MG/1; MG/1
30 CAPSULE, EXTENDED RELEASE ORAL DAILY
Qty: 30 CAPSULE | Refills: 0 | Status: SHIPPED | OUTPATIENT
Start: 2020-10-05 | End: 2020-11-04 | Stop reason: SDUPTHER

## 2020-11-04 DIAGNOSIS — F90.2 ATTENTION DEFICIT HYPERACTIVITY DISORDER (ADHD), COMBINED TYPE: ICD-10-CM

## 2020-11-04 RX ORDER — DEXTROAMPHETAMINE SACCHARATE, AMPHETAMINE ASPARTATE MONOHYDRATE, DEXTROAMPHETAMINE SULFATE AND AMPHETAMINE SULFATE 7.5; 7.5; 7.5; 7.5 MG/1; MG/1; MG/1; MG/1
30 CAPSULE, EXTENDED RELEASE ORAL DAILY
Qty: 30 CAPSULE | Refills: 0 | Status: SHIPPED | OUTPATIENT
Start: 2020-11-04 | End: 2020-12-21 | Stop reason: SDUPTHER

## 2020-11-26 ENCOUNTER — PATIENT MESSAGE (OUTPATIENT)
Dept: INTERNAL MEDICINE | Facility: CLINIC | Age: 30
End: 2020-11-26

## 2020-11-30 RX ORDER — ACYCLOVIR 800 MG/1
800 TABLET ORAL 3 TIMES DAILY PRN
Qty: 15 TABLET | Refills: 1 | Status: SHIPPED | OUTPATIENT
Start: 2020-11-30 | End: 2021-03-16 | Stop reason: SDUPTHER

## 2020-11-30 NOTE — TELEPHONE ENCOUNTER
From: Selma Rangel  To: MARK Greco  Sent: 11/26/2020 1:16 PM EST  Subject: Prescription Question    Hello. I am not in town and I am getting a cold soar. Is there any chance I can have my medication sent to this location.     [address] [place] [address]  [facility]

## 2020-12-18 ENCOUNTER — LAB (OUTPATIENT)
Dept: LAB | Facility: HOSPITAL | Age: 30
End: 2020-12-18

## 2020-12-18 ENCOUNTER — OFFICE VISIT (OUTPATIENT)
Dept: INTERNAL MEDICINE | Facility: CLINIC | Age: 30
End: 2020-12-18

## 2020-12-18 VITALS
HEIGHT: 70 IN | WEIGHT: 159 LBS | HEART RATE: 88 BPM | SYSTOLIC BLOOD PRESSURE: 126 MMHG | BODY MASS INDEX: 22.76 KG/M2 | DIASTOLIC BLOOD PRESSURE: 84 MMHG | TEMPERATURE: 96.9 F

## 2020-12-18 DIAGNOSIS — Z13.220 LIPID SCREENING: ICD-10-CM

## 2020-12-18 DIAGNOSIS — F90.2 ATTENTION DEFICIT HYPERACTIVITY DISORDER (ADHD), COMBINED TYPE: ICD-10-CM

## 2020-12-18 DIAGNOSIS — R53.83 FATIGUE, UNSPECIFIED TYPE: ICD-10-CM

## 2020-12-18 DIAGNOSIS — Z13.0 SCREENING, DEFICIENCY ANEMIA, IRON: ICD-10-CM

## 2020-12-18 DIAGNOSIS — Z79.899 LONG-TERM USE OF HIGH-RISK MEDICATION: ICD-10-CM

## 2020-12-18 DIAGNOSIS — F33.1 MAJOR DEPRESSIVE DISORDER, RECURRENT EPISODE, MODERATE DEGREE (HCC): Primary | ICD-10-CM

## 2020-12-18 LAB
BASOPHILS # BLD AUTO: 0.02 10*3/MM3 (ref 0–0.2)
BASOPHILS NFR BLD AUTO: 0.3 % (ref 0–1.5)
DEPRECATED RDW RBC AUTO: 37.1 FL (ref 37–54)
EOSINOPHIL # BLD AUTO: 0.14 10*3/MM3 (ref 0–0.4)
EOSINOPHIL NFR BLD AUTO: 1.9 % (ref 0.3–6.2)
ERYTHROCYTE [DISTWIDTH] IN BLOOD BY AUTOMATED COUNT: 11.7 % (ref 12.3–15.4)
HCT VFR BLD AUTO: 43.1 % (ref 34–46.6)
HGB BLD-MCNC: 14.9 G/DL (ref 12–15.9)
IMM GRANULOCYTES # BLD AUTO: 0.03 10*3/MM3 (ref 0–0.05)
IMM GRANULOCYTES NFR BLD AUTO: 0.4 % (ref 0–0.5)
LYMPHOCYTES # BLD AUTO: 2.66 10*3/MM3 (ref 0.7–3.1)
LYMPHOCYTES NFR BLD AUTO: 35.3 % (ref 19.6–45.3)
MCH RBC QN AUTO: 30.1 PG (ref 26.6–33)
MCHC RBC AUTO-ENTMCNC: 34.6 G/DL (ref 31.5–35.7)
MCV RBC AUTO: 87.1 FL (ref 79–97)
MONOCYTES # BLD AUTO: 0.69 10*3/MM3 (ref 0.1–0.9)
MONOCYTES NFR BLD AUTO: 9.2 % (ref 5–12)
NEUTROPHILS NFR BLD AUTO: 3.99 10*3/MM3 (ref 1.7–7)
NEUTROPHILS NFR BLD AUTO: 52.9 % (ref 42.7–76)
NRBC BLD AUTO-RTO: 0 /100 WBC (ref 0–0.2)
PLATELET # BLD AUTO: 392 10*3/MM3 (ref 140–450)
PMV BLD AUTO: 10.1 FL (ref 6–12)
RBC # BLD AUTO: 4.95 10*6/MM3 (ref 3.77–5.28)
WBC # BLD AUTO: 7.53 10*3/MM3 (ref 3.4–10.8)

## 2020-12-18 PROCEDURE — 82607 VITAMIN B-12: CPT

## 2020-12-18 PROCEDURE — 90686 IIV4 VACC NO PRSV 0.5 ML IM: CPT | Performed by: NURSE PRACTITIONER

## 2020-12-18 PROCEDURE — 84443 ASSAY THYROID STIM HORMONE: CPT

## 2020-12-18 PROCEDURE — 90471 IMMUNIZATION ADMIN: CPT | Performed by: NURSE PRACTITIONER

## 2020-12-18 PROCEDURE — 85025 COMPLETE CBC W/AUTO DIFF WBC: CPT

## 2020-12-18 PROCEDURE — 82306 VITAMIN D 25 HYDROXY: CPT

## 2020-12-18 PROCEDURE — 80061 LIPID PANEL: CPT

## 2020-12-18 PROCEDURE — 80053 COMPREHEN METABOLIC PANEL: CPT

## 2020-12-18 PROCEDURE — 99214 OFFICE O/P EST MOD 30 MIN: CPT | Performed by: NURSE PRACTITIONER

## 2020-12-18 RX ORDER — BUPROPION HYDROCHLORIDE 150 MG/1
150 TABLET ORAL DAILY
Qty: 90 TABLET | Refills: 0 | Status: SHIPPED | OUTPATIENT
Start: 2020-12-18 | End: 2021-02-17 | Stop reason: SDUPTHER

## 2020-12-18 RX ORDER — VENLAFAXINE HYDROCHLORIDE 75 MG/1
75 CAPSULE, EXTENDED RELEASE ORAL DAILY
Qty: 30 CAPSULE | Refills: 2 | Status: SHIPPED | OUTPATIENT
Start: 2020-12-18 | End: 2021-01-26 | Stop reason: SDUPTHER

## 2020-12-18 NOTE — PROGRESS NOTES
Selma Rangel  1990  [id number]  Patient Care Team:  Shaila Cole APRN as PCP - General (Internal Medicine)    Selma Rangel is a pleasant 30 y.o. female who presents for evaluation of ADD (3 month follow up )    Chief Complaint   Patient presents with   • ADD     3 month follow up        HPI:   More depression in the last 5 mo, hard to get motivated, sent home yesterday secondary to HA (Covid test neg.)  Has been late to work on a few occassions and reports excessive sleep.Works 5am to 1pm, then typically takes dogs to park, naps, then 4:30 eats and wants to go back to bed. Late here today because she went back to bed this am. Today [birth year] her birthday. Reports relationship with boyfriend going well.  Despite depression she is hoping she could stop Wellbutrin, more depression that she has to take 2 pills for this.  Wonders if inc. Venlafaxine would be better and stop Wellbutrin.  Venlafaxine has been good for her.    Out of adderall since last week, does not take on days off, has not thought to refill    Constipation:  Better on trulance daily since last visit, BM daily, no straining or hard stools, no blood in stool, was off unintentionally for a few days and had sig recurrence of sx.     Acne:  On spironolactone but has been offi, has plenty at home    THC first time in her life 2 nights ago, no plans to continue, wanted to let me know since her drug screen is due.  Past Medical History:   Diagnosis Date   • ADHD (attention deficit hyperactivity disorder) 1/23/1997    Was tested as a child because I would struggle in school   • Arthritis 11/12/2018   • Depression    • Irritable bowel syndrome      Past Surgical History:   Procedure Laterality Date   • COLONOSCOPY       Family History   Problem Relation Age of Onset   • Depression Mother    • Miscarriages / Stillbirths Mother    • Depression Sister      Social History     Tobacco Use   Smoking Status Never Smoker   Smokeless Tobacco Never Used   Tobacco  "Comment    Never used it. But my boyfriend does sometimes smoke around me.     No Known Allergies    Current Outpatient Medications:   •  acyclovir (ZOVIRAX) 800 MG tablet, Take 1 tablet by mouth 3 (Three) Times a Day As Needed (outbreak)., Disp: 15 tablet, Rfl: 1  •  amphetamine-dextroamphetamine XR (ADDERALL XR) 30 MG 24 hr capsule, Take 1 capsule by mouth Daily, Disp: 30 capsule, Rfl: 0  •  buPROPion XL (WELLBUTRIN XL) 150 MG 24 hr tablet, Take 1 tablet by mouth Daily., Disp: 90 tablet, Rfl: 0  •  NUVARING 0.12-0.015 MG/24HR vaginal ring, INSERT ONE RING VAGINALLY FOR THREE WEEKS THEN ONE WEEK OFF, Disp: , Rfl: 2  •  Plecanatide (Trulance) 3 MG tablet, Take 1 tablet by mouth Daily. Indications: Constipation caused by Irritable Bowel Syndrome, Disp: 90 tablet, Rfl: 1  •  spironolactone (ALDACTONE) 100 MG tablet, Take 1 tablet by mouth Daily., Disp: 30 tablet, Rfl: 2  •  venlafaxine XR (EFFEXOR-XR) 75 MG 24 hr capsule, Take 1 capsule by mouth Daily., Disp: 30 capsule, Rfl: 2    Review of Systems   Constitutional: Negative for chills, fatigue and fever.   HENT: Negative for congestion, ear pain and sinus pressure.    Respiratory: Negative for cough, chest tightness, shortness of breath and wheezing.    Cardiovascular: Negative for chest pain and palpitations.   Gastrointestinal: Negative for abdominal pain, blood in stool and constipation.   Skin: Negative for color change.   Allergic/Immunologic: Negative for environmental allergies.   Neurological: Positive for headache. Negative for dizziness and speech difficulty.   Psychiatric/Behavioral: Negative for decreased concentration. The patient is not nervous/anxious.      /84 (BP Location: Left arm, Patient Position: Sitting, Cuff Size: Adult)   Pulse 88   Temp 96.9 °F (36.1 °C) (Temporal)   Ht 177.8 cm (70\")   Wt 72.1 kg (159 lb)   BMI 22.81 kg/m²     Physical Exam  Vitals signs reviewed.   Constitutional:       Appearance: She is well-developed. "   Pulmonary:      Effort: Pulmonary effort is normal.   Skin:     General: Skin is warm and dry.   Neurological:      Mental Status: She is alert.   Psychiatric:         Attention and Perception: Attention normal.         Mood and Affect: Mood is depressed. Affect is flat.         Speech: Speech normal.         Behavior: Behavior normal.         Thought Content: Thought content normal. Thought content does not include suicidal ideation.         Cognition and Memory: Cognition normal.         Judgment: Judgment normal.         Procedures    Results Review:  None    PHQ-9 Total Score: 17    Assessment/Plan:  Diagnoses and all orders for this visit:    1. Major depressive disorder, recurrent episode, moderate degree (CMS/HCC) (Primary)  Comments:  increase venlafaxine to 75mg daily and continue Wellbutrin for now.  Will re-eval in 3 mo    2. Attention deficit hyperactivity disorder (ADHD), combined type  Comments:  refill pending UDS    3. Lipid screening  -     Comprehensive Metabolic Panel; Future  -     Lipid Panel; Future  -     TSH Rfx On Abnormal To Free T4; Future    4. Fatigue, unspecified type  -     Vitamin B12; Future  -     Vitamin D 25 Hydroxy; Future    5. Long-term use of high-risk medication  -     Pain Management Profile (13 Drugs) Urine - Urine, Clean Catch; Future    6. Screening, deficiency anemia, iron  -     CBC & Differential; Future    Other orders  -     venlafaxine XR (EFFEXOR-XR) 75 MG 24 hr capsule; Take 1 capsule by mouth Daily.  Dispense: 30 capsule; Refill: 2  -     buPROPion XL (WELLBUTRIN XL) 150 MG 24 hr tablet; Take 1 tablet by mouth Daily.  Dispense: 90 tablet; Refill: 0  -     Fluarix/Fluzone/Afluria Quad>6 Months       Patient Instructions   Will refill adderal when we get results of drug screen.    Increase venlafaxine to 75mg daily and for now continue bupropion daily.  Will re-evaluate in 3 mo.  Start walking daily as discussed.  Return if new or worse symptoms.    Continue  Trulance daily for constipation with good hydration and dietary fiber.    Plan of care reviewed with patient at the conclusion of today's visit. Education was provided regarding diagnosis, management and any prescribed or recommended OTC medications.  Patient verbalizes understanding of and agreement with management plan.    Return in about 3 months (around 3/18/2021) for Annual physical, Labs this visit.    *Note that portions of this note were completed with a voice recognition program.  Efforts were made to edit the dictation but occasionally words are transcribed.    MARK Greco          Answers for HPI/ROS submitted by the patient on 12/17/2020   What is the primary reason for your visit?: Other  Please describe your symptoms.: Id like to drop the wellbutrian if i can and increase my other antidepressant. Also, my neck pain has gotten to the point i can hardly handle it. I need to get that neck dr info again becuase covid happened and i didnt go. I think i have arthritis in my hands becuawe they constantly hurt and i drop things bc i cant hold on tight. (Like the dog leash)  Have you had these symptoms before?: Yes  How long have you been having these symptoms?: Greater than 2 weeks

## 2020-12-19 LAB
25(OH)D3 SERPL-MCNC: 22.5 NG/ML (ref 30–100)
ALBUMIN SERPL-MCNC: 4.2 G/DL (ref 3.5–5.2)
ALBUMIN/GLOB SERPL: 1.4 G/DL
ALP SERPL-CCNC: 67 U/L (ref 39–117)
ALT SERPL W P-5'-P-CCNC: 14 U/L (ref 1–33)
ANION GAP SERPL CALCULATED.3IONS-SCNC: 9.7 MMOL/L (ref 5–15)
AST SERPL-CCNC: 43 U/L (ref 1–32)
BILIRUB SERPL-MCNC: <0.2 MG/DL (ref 0–1.2)
BUN SERPL-MCNC: 12 MG/DL (ref 6–20)
BUN/CREAT SERPL: 18.2 (ref 7–25)
CALCIUM SPEC-SCNC: 9.8 MG/DL (ref 8.6–10.5)
CHLORIDE SERPL-SCNC: 103 MMOL/L (ref 98–107)
CHOLEST SERPL-MCNC: 210 MG/DL (ref 0–200)
CO2 SERPL-SCNC: 27.3 MMOL/L (ref 22–29)
CREAT SERPL-MCNC: 0.66 MG/DL (ref 0.57–1)
GFR SERPL CREATININE-BSD FRML MDRD: 104 ML/MIN/1.73
GLOBULIN UR ELPH-MCNC: 2.9 GM/DL
GLUCOSE SERPL-MCNC: 72 MG/DL (ref 65–99)
HDLC SERPL-MCNC: 89 MG/DL (ref 40–60)
LDLC SERPL CALC-MCNC: 97 MG/DL (ref 0–100)
LDLC/HDLC SERPL: 1.03 {RATIO}
POTASSIUM SERPL-SCNC: 4.5 MMOL/L (ref 3.5–5.2)
PROT SERPL-MCNC: 7.1 G/DL (ref 6–8.5)
SODIUM SERPL-SCNC: 140 MMOL/L (ref 136–145)
TRIGL SERPL-MCNC: 145 MG/DL (ref 0–150)
TSH SERPL DL<=0.05 MIU/L-ACNC: 1.04 UIU/ML (ref 0.27–4.2)
VIT B12 BLD-MCNC: 160 PG/ML (ref 211–946)
VLDLC SERPL-MCNC: 24 MG/DL (ref 5–40)

## 2020-12-19 NOTE — PATIENT INSTRUCTIONS
Will refill adderal when we get results of drug screen.    Increase venlafaxine to 75mg daily and for now continue bupropion daily.  Will re-evaluate in 3 mo.  Start walking daily as discussed.  Return if new or worse symptoms.    Continue Trulance daily for constipation with good hydration and dietary fiber.

## 2020-12-21 ENCOUNTER — PATIENT MESSAGE (OUTPATIENT)
Dept: INTERNAL MEDICINE | Facility: CLINIC | Age: 30
End: 2020-12-21

## 2020-12-21 DIAGNOSIS — F90.2 ATTENTION DEFICIT HYPERACTIVITY DISORDER (ADHD), COMBINED TYPE: ICD-10-CM

## 2020-12-21 PROBLEM — E55.9 VITAMIN D DEFICIENCY: Status: ACTIVE | Noted: 2020-12-21

## 2020-12-21 PROBLEM — E53.8 VITAMIN B 12 DEFICIENCY: Status: ACTIVE | Noted: 2020-12-21

## 2020-12-21 RX ORDER — ERGOCALCIFEROL 1.25 MG/1
50000 CAPSULE ORAL WEEKLY
Qty: 12 CAPSULE | Refills: 1 | Status: SHIPPED | OUTPATIENT
Start: 2020-12-21 | End: 2021-03-16 | Stop reason: SDUPTHER

## 2020-12-21 RX ORDER — LANOLIN ALCOHOL/MO/W.PET/CERES
1000 CREAM (GRAM) TOPICAL DAILY
Qty: 90 TABLET | Refills: 1 | Status: SHIPPED | OUTPATIENT
Start: 2020-12-21 | End: 2021-02-15

## 2020-12-21 RX ORDER — DEXTROAMPHETAMINE SACCHARATE, AMPHETAMINE ASPARTATE MONOHYDRATE, DEXTROAMPHETAMINE SULFATE AND AMPHETAMINE SULFATE 7.5; 7.5; 7.5; 7.5 MG/1; MG/1; MG/1; MG/1
30 CAPSULE, EXTENDED RELEASE ORAL DAILY
Qty: 30 CAPSULE | Refills: 0 | Status: SHIPPED | OUTPATIENT
Start: 2020-12-21 | End: 2021-02-19 | Stop reason: SDUPTHER

## 2020-12-21 NOTE — TELEPHONE ENCOUNTER
From: Selma Rangel  To: MARK Greco  Sent: 12/21/2020 7:30 AM EST  Subject: Prescription Question    I didnt leave yesterday in hopes i can get this refilled before i drive to [place]! I will be back in a week and will do my drug test then if allowed! We are leaving early today! I just didn't want to drive that far with out my medicine.

## 2020-12-29 PROCEDURE — U0004 COV-19 TEST NON-CDC HGH THRU: HCPCS | Performed by: NURSE PRACTITIONER

## 2021-01-25 ENCOUNTER — PATIENT MESSAGE (OUTPATIENT)
Dept: INTERNAL MEDICINE | Facility: CLINIC | Age: 31
End: 2021-01-25

## 2021-01-25 DIAGNOSIS — Z79.899 LONG-TERM USE OF HIGH-RISK MEDICATION: Primary | ICD-10-CM

## 2021-01-25 DIAGNOSIS — Z30.44 ENCOUNTER FOR SURVEILLANCE OF VAGINAL RING HORMONAL CONTRACEPTIVE DEVICE: ICD-10-CM

## 2021-01-26 RX ORDER — ETONOGESTREL/ETHINYL ESTRADIOL .12-.015MG
RING, VAGINAL VAGINAL
Qty: 12 EACH | Refills: 2 | Status: SHIPPED | OUTPATIENT
Start: 2021-01-26 | End: 2021-06-29

## 2021-01-26 RX ORDER — VENLAFAXINE HYDROCHLORIDE 37.5 MG/1
75 CAPSULE, EXTENDED RELEASE ORAL DAILY
Qty: 90 CAPSULE | Refills: 0 | Status: SHIPPED | OUTPATIENT
Start: 2021-01-26 | End: 2021-04-13

## 2021-01-26 NOTE — TELEPHONE ENCOUNTER
From: Selma Rangel  To: MARK Greco  Sent: 1/25/2021 10:28 PM EST  Subject: Prescription Question    Hello. Is it possible to get the nuva ring refilled after all? Also, when I do my urine test tomorrow, (I haven't gone and done it yet) do they also run a pregnancy test?

## 2021-02-04 ENCOUNTER — PATIENT MESSAGE (OUTPATIENT)
Dept: INTERNAL MEDICINE | Facility: CLINIC | Age: 31
End: 2021-02-04

## 2021-02-04 NOTE — TELEPHONE ENCOUNTER
From: Selma Rangel  To: MARK Greco  Sent: 2/4/2021 12:47 PM EST  Subject: Prescription Question    Hello, I recently had to move to [facility] on short notice due to any boyfriend and I breaking up and it not being safe to stay in [place]. Is there a place out here I can do my drug test so I can get my adderal refilled? Also, I'd need it sent to a different pharmacy!     [address] [id number]  [facility]

## 2021-02-05 NOTE — TELEPHONE ENCOUNTER
I am not familiar with the area or where a lab would be convenient for patients new location. I recommend changing the lab to external and mailing it to patient.

## 2021-02-13 DIAGNOSIS — F90.2 ATTENTION DEFICIT HYPERACTIVITY DISORDER (ADHD), COMBINED TYPE: ICD-10-CM

## 2021-02-13 RX ORDER — DEXTROAMPHETAMINE SACCHARATE, AMPHETAMINE ASPARTATE MONOHYDRATE, DEXTROAMPHETAMINE SULFATE AND AMPHETAMINE SULFATE 7.5; 7.5; 7.5; 7.5 MG/1; MG/1; MG/1; MG/1
30 CAPSULE, EXTENDED RELEASE ORAL DAILY
Qty: 30 CAPSULE | Refills: 0 | Status: CANCELLED | OUTPATIENT
Start: 2021-02-13

## 2021-02-15 DIAGNOSIS — F90.2 ATTENTION DEFICIT HYPERACTIVITY DISORDER (ADHD), COMBINED TYPE: ICD-10-CM

## 2021-02-15 RX ORDER — DEXTROAMPHETAMINE SACCHARATE, AMPHETAMINE ASPARTATE MONOHYDRATE, DEXTROAMPHETAMINE SULFATE AND AMPHETAMINE SULFATE 7.5; 7.5; 7.5; 7.5 MG/1; MG/1; MG/1; MG/1
30 CAPSULE, EXTENDED RELEASE ORAL DAILY
Qty: 30 CAPSULE | Refills: 0 | Status: CANCELLED | OUTPATIENT
Start: 2021-02-15

## 2021-02-15 RX ORDER — LANOLIN ALCOHOL/MO/W.PET/CERES
1000 CREAM (GRAM) TOPICAL DAILY
Qty: 90 TABLET | Refills: 1 | Status: SHIPPED | OUTPATIENT
Start: 2021-02-15 | End: 2021-03-16 | Stop reason: SDUPTHER

## 2021-02-17 ENCOUNTER — PATIENT MESSAGE (OUTPATIENT)
Dept: INTERNAL MEDICINE | Facility: CLINIC | Age: 31
End: 2021-02-17

## 2021-02-17 RX ORDER — BUPROPION HYDROCHLORIDE 150 MG/1
150 TABLET ORAL DAILY
Qty: 90 TABLET | Refills: 0 | Status: SHIPPED | OUTPATIENT
Start: 2021-02-17 | End: 2021-06-29

## 2021-02-17 NOTE — TELEPHONE ENCOUNTER
From: Selma Rangel  To: MARK Greco  Sent: 2/17/2021 12:16 PM EST  Subject: Prescription Question    Hello. I need my Wellbutrin refilled please :)

## 2021-02-18 ENCOUNTER — LAB (OUTPATIENT)
Dept: LAB | Facility: HOSPITAL | Age: 31
End: 2021-02-18

## 2021-02-18 DIAGNOSIS — F33.1 MODERATE EPISODE OF RECURRENT MAJOR DEPRESSIVE DISORDER (HCC): ICD-10-CM

## 2021-02-18 DIAGNOSIS — Z79.899 LONG-TERM USE OF HIGH-RISK MEDICATION: ICD-10-CM

## 2021-02-18 DIAGNOSIS — Z13.0 SCREENING, DEFICIENCY ANEMIA, IRON: Primary | ICD-10-CM

## 2021-02-18 DIAGNOSIS — R53.83 FATIGUE, UNSPECIFIED TYPE: ICD-10-CM

## 2021-02-18 LAB
AMPHET+METHAMPHET UR QL: NEGATIVE
AMPHETAMINES UR QL: NEGATIVE
BARBITURATES UR QL SCN: NEGATIVE
BENZODIAZ UR QL SCN: NEGATIVE
BUPRENORPHINE SERPL-MCNC: NEGATIVE NG/ML
CANNABINOIDS SERPL QL: NEGATIVE
COCAINE UR QL: NEGATIVE
HCG INTACT+B SERPL-ACNC: <0.5 MIU/ML
METHADONE UR QL SCN: NEGATIVE
OPIATES UR QL: NEGATIVE
OXYCODONE UR QL SCN: NEGATIVE
PCP UR QL SCN: NEGATIVE
PROPOXYPH UR QL: NEGATIVE
TRICYCLICS UR QL SCN: NEGATIVE

## 2021-02-18 PROCEDURE — 84702 CHORIONIC GONADOTROPIN TEST: CPT

## 2021-02-18 PROCEDURE — 80306 DRUG TEST PRSMV INSTRMNT: CPT

## 2021-02-19 ENCOUNTER — TELEPHONE (OUTPATIENT)
Dept: INTERNAL MEDICINE | Facility: CLINIC | Age: 31
End: 2021-02-19

## 2021-02-19 DIAGNOSIS — F90.2 ATTENTION DEFICIT HYPERACTIVITY DISORDER (ADHD), COMBINED TYPE: ICD-10-CM

## 2021-02-19 RX ORDER — DEXTROAMPHETAMINE SACCHARATE, AMPHETAMINE ASPARTATE MONOHYDRATE, DEXTROAMPHETAMINE SULFATE AND AMPHETAMINE SULFATE 7.5; 7.5; 7.5; 7.5 MG/1; MG/1; MG/1; MG/1
30 CAPSULE, EXTENDED RELEASE ORAL DAILY
Qty: 30 CAPSULE | Refills: 0 | Status: SHIPPED | OUTPATIENT
Start: 2021-02-19 | End: 2021-02-19 | Stop reason: SDUPTHER

## 2021-02-19 RX ORDER — DEXTROAMPHETAMINE SACCHARATE, AMPHETAMINE ASPARTATE MONOHYDRATE, DEXTROAMPHETAMINE SULFATE AND AMPHETAMINE SULFATE 7.5; 7.5; 7.5; 7.5 MG/1; MG/1; MG/1; MG/1
30 CAPSULE, EXTENDED RELEASE ORAL DAILY
Qty: 30 CAPSULE | Refills: 0 | Status: SHIPPED | OUTPATIENT
Start: 2021-02-19 | End: 2021-03-16 | Stop reason: SDUPTHER

## 2021-03-16 DIAGNOSIS — F90.2 ATTENTION DEFICIT HYPERACTIVITY DISORDER (ADHD), COMBINED TYPE: ICD-10-CM

## 2021-03-16 RX ORDER — PLECANATIDE 3 MG/1
1 TABLET ORAL DAILY
Qty: 90 TABLET | Refills: 1 | Status: SHIPPED | OUTPATIENT
Start: 2021-03-16 | End: 2021-06-29

## 2021-03-16 RX ORDER — DEXTROAMPHETAMINE SACCHARATE, AMPHETAMINE ASPARTATE MONOHYDRATE, DEXTROAMPHETAMINE SULFATE AND AMPHETAMINE SULFATE 7.5; 7.5; 7.5; 7.5 MG/1; MG/1; MG/1; MG/1
30 CAPSULE, EXTENDED RELEASE ORAL DAILY
Qty: 30 CAPSULE | Refills: 0 | Status: SHIPPED | OUTPATIENT
Start: 2021-03-16 | End: 2021-03-23 | Stop reason: SDUPTHER

## 2021-03-16 RX ORDER — LANOLIN ALCOHOL/MO/W.PET/CERES
1000 CREAM (GRAM) TOPICAL DAILY
Qty: 90 TABLET | Refills: 1 | Status: SHIPPED | OUTPATIENT
Start: 2021-03-16 | End: 2022-09-09 | Stop reason: SDUPTHER

## 2021-03-16 RX ORDER — ERGOCALCIFEROL 1.25 MG/1
50000 CAPSULE ORAL WEEKLY
Qty: 12 CAPSULE | Refills: 1 | Status: SHIPPED | OUTPATIENT
Start: 2021-03-16 | End: 2021-10-01 | Stop reason: SDUPTHER

## 2021-03-16 RX ORDER — ACYCLOVIR 800 MG/1
800 TABLET ORAL 3 TIMES DAILY PRN
Qty: 15 TABLET | Refills: 1 | Status: SHIPPED | OUTPATIENT
Start: 2021-03-16 | End: 2022-04-14

## 2021-03-16 RX ORDER — SPIRONOLACTONE 100 MG/1
100 TABLET, FILM COATED ORAL DAILY
Qty: 30 TABLET | Refills: 2 | Status: SHIPPED | OUTPATIENT
Start: 2021-03-16 | End: 2022-04-20

## 2021-03-19 DIAGNOSIS — F90.2 ATTENTION DEFICIT HYPERACTIVITY DISORDER (ADHD), COMBINED TYPE: ICD-10-CM

## 2021-03-19 RX ORDER — DEXTROAMPHETAMINE SACCHARATE, AMPHETAMINE ASPARTATE MONOHYDRATE, DEXTROAMPHETAMINE SULFATE AND AMPHETAMINE SULFATE 7.5; 7.5; 7.5; 7.5 MG/1; MG/1; MG/1; MG/1
30 CAPSULE, EXTENDED RELEASE ORAL DAILY
Qty: 30 CAPSULE | Refills: 0 | Status: CANCELLED | OUTPATIENT
Start: 2021-03-19

## 2021-03-23 DIAGNOSIS — F90.2 ATTENTION DEFICIT HYPERACTIVITY DISORDER (ADHD), COMBINED TYPE: ICD-10-CM

## 2021-03-23 RX ORDER — DEXTROAMPHETAMINE SACCHARATE, AMPHETAMINE ASPARTATE MONOHYDRATE, DEXTROAMPHETAMINE SULFATE AND AMPHETAMINE SULFATE 7.5; 7.5; 7.5; 7.5 MG/1; MG/1; MG/1; MG/1
30 CAPSULE, EXTENDED RELEASE ORAL DAILY
Qty: 30 CAPSULE | Refills: 0 | Status: SHIPPED | OUTPATIENT
Start: 2021-03-23 | End: 2021-04-22 | Stop reason: SDUPTHER

## 2021-04-13 RX ORDER — VENLAFAXINE HYDROCHLORIDE 37.5 MG/1
75 CAPSULE, EXTENDED RELEASE ORAL DAILY
Qty: 90 CAPSULE | Refills: 0 | Status: SHIPPED | OUTPATIENT
Start: 2021-04-13 | End: 2021-06-02 | Stop reason: SDUPTHER

## 2021-04-16 ENCOUNTER — BULK ORDERING (OUTPATIENT)
Dept: CASE MANAGEMENT | Facility: OTHER | Age: 31
End: 2021-04-16

## 2021-04-16 DIAGNOSIS — Z23 IMMUNIZATION DUE: ICD-10-CM

## 2021-04-19 DIAGNOSIS — F90.2 ATTENTION DEFICIT HYPERACTIVITY DISORDER (ADHD), COMBINED TYPE: ICD-10-CM

## 2021-04-20 DIAGNOSIS — F90.2 ATTENTION DEFICIT HYPERACTIVITY DISORDER (ADHD), COMBINED TYPE: ICD-10-CM

## 2021-04-20 RX ORDER — DEXTROAMPHETAMINE SACCHARATE, AMPHETAMINE ASPARTATE MONOHYDRATE, DEXTROAMPHETAMINE SULFATE AND AMPHETAMINE SULFATE 7.5; 7.5; 7.5; 7.5 MG/1; MG/1; MG/1; MG/1
30 CAPSULE, EXTENDED RELEASE ORAL DAILY
Qty: 30 CAPSULE | Refills: 0 | OUTPATIENT
Start: 2021-04-20

## 2021-04-20 RX ORDER — DEXTROAMPHETAMINE SACCHARATE, AMPHETAMINE ASPARTATE MONOHYDRATE, DEXTROAMPHETAMINE SULFATE AND AMPHETAMINE SULFATE 7.5; 7.5; 7.5; 7.5 MG/1; MG/1; MG/1; MG/1
30 CAPSULE, EXTENDED RELEASE ORAL DAILY
Qty: 30 CAPSULE | Refills: 0 | Status: CANCELLED | OUTPATIENT
Start: 2021-04-20

## 2021-04-21 ENCOUNTER — PATIENT MESSAGE (OUTPATIENT)
Dept: INTERNAL MEDICINE | Facility: CLINIC | Age: 31
End: 2021-04-21

## 2021-04-21 DIAGNOSIS — F90.2 ATTENTION DEFICIT HYPERACTIVITY DISORDER (ADHD), COMBINED TYPE: ICD-10-CM

## 2021-04-21 NOTE — TELEPHONE ENCOUNTER
From: Selma Rangel  To: MARK Greco  Sent: 4/21/2021 12:06 PM EDT  Subject: Prescription Question    Hello, I am needing to refill my D-Amphetamine ER 30mg. Can I have it sent to this pharmacy today? I'd like to try to pick it up once I'm off of work. However, I'm not sure if it was sent or what pharmacy it is at because my Snaptrip clarice shows different locations.     [address]

## 2021-04-22 ENCOUNTER — PATIENT MESSAGE (OUTPATIENT)
Dept: INTERNAL MEDICINE | Facility: CLINIC | Age: 31
End: 2021-04-22

## 2021-04-22 RX ORDER — DEXTROAMPHETAMINE SACCHARATE, AMPHETAMINE ASPARTATE MONOHYDRATE, DEXTROAMPHETAMINE SULFATE AND AMPHETAMINE SULFATE 7.5; 7.5; 7.5; 7.5 MG/1; MG/1; MG/1; MG/1
30 CAPSULE, EXTENDED RELEASE ORAL DAILY
Qty: 30 CAPSULE | Refills: 0 | Status: SHIPPED | OUTPATIENT
Start: 2021-04-22 | End: 2021-05-20 | Stop reason: SDUPTHER

## 2021-04-23 NOTE — TELEPHONE ENCOUNTER
From: Selma Rangel  To: MARK Greco  Sent: 4/22/2021 9:32 PM EDT  Subject: Prescription Question    Hello, I am curious why I have to go to the doctor again to get my adderall refilled? I feel like I just went a few months ago and the drug tests are $300 a pop for me and I ended up doing it twice since it didn't work the last time the first time. To be honest I don't have that extra income to be paying for co pays and gas money and then these drug tests. Is there no way to get my refill sent like normal?

## 2021-05-20 DIAGNOSIS — F90.2 ATTENTION DEFICIT HYPERACTIVITY DISORDER (ADHD), COMBINED TYPE: ICD-10-CM

## 2021-05-20 RX ORDER — DEXTROAMPHETAMINE SACCHARATE, AMPHETAMINE ASPARTATE MONOHYDRATE, DEXTROAMPHETAMINE SULFATE AND AMPHETAMINE SULFATE 7.5; 7.5; 7.5; 7.5 MG/1; MG/1; MG/1; MG/1
30 CAPSULE, EXTENDED RELEASE ORAL DAILY
Qty: 30 CAPSULE | Refills: 0 | Status: SHIPPED | OUTPATIENT
Start: 2021-05-20 | End: 2021-06-25 | Stop reason: SDUPTHER

## 2021-05-20 NOTE — TELEPHONE ENCOUNTER
Last refill:  4/22/21 #30/0  Last office visit:  12/18/20  Next office visit:  none   ankle pain/injury

## 2021-06-02 RX ORDER — VENLAFAXINE HYDROCHLORIDE 37.5 MG/1
75 CAPSULE, EXTENDED RELEASE ORAL DAILY
Qty: 180 CAPSULE | Refills: 1 | Status: SHIPPED | OUTPATIENT
Start: 2021-06-02 | End: 2021-06-07 | Stop reason: SDUPTHER

## 2021-06-07 ENCOUNTER — TELEPHONE (OUTPATIENT)
Dept: INTERNAL MEDICINE | Facility: CLINIC | Age: 31
End: 2021-06-07

## 2021-06-07 RX ORDER — VENLAFAXINE HYDROCHLORIDE 37.5 MG/1
75 CAPSULE, EXTENDED RELEASE ORAL DAILY
Qty: 180 CAPSULE | Refills: 1 | Status: SHIPPED | OUTPATIENT
Start: 2021-06-07 | End: 2021-06-29

## 2021-06-07 NOTE — TELEPHONE ENCOUNTER
Call[id number] [place], [place] - [address] [facility] [address] - [phone] [facility] [phone] FX    Relationship: Pharmacy    Best call back number: [phone]    Medication needed:   Requested Prescriptions     Pending Prescriptions Disp Refills   • venlafaxine XR (EFFEXOR-XR) 37.5 MG 24 hr capsule 180 capsule 1     Sig: Take 2 capsules by mouth Daily.       What is the patient's preferred pharmacy: [id number] - [place], [place] - [address] [facility] [address] - [phone] [facility] [phone] FX

## 2021-06-21 ENCOUNTER — PATIENT MESSAGE (OUTPATIENT)
Dept: INTERNAL MEDICINE | Facility: CLINIC | Age: 31
End: 2021-06-21

## 2021-06-21 DIAGNOSIS — F90.2 ATTENTION DEFICIT HYPERACTIVITY DISORDER (ADHD), COMBINED TYPE: ICD-10-CM

## 2021-06-21 RX ORDER — DEXTROAMPHETAMINE SACCHARATE, AMPHETAMINE ASPARTATE MONOHYDRATE, DEXTROAMPHETAMINE SULFATE AND AMPHETAMINE SULFATE 7.5; 7.5; 7.5; 7.5 MG/1; MG/1; MG/1; MG/1
30 CAPSULE, EXTENDED RELEASE ORAL DAILY
Qty: 30 CAPSULE | Refills: 0 | OUTPATIENT
Start: 2021-06-21

## 2021-06-22 RX ORDER — DEXTROAMPHETAMINE SACCHARATE, AMPHETAMINE ASPARTATE MONOHYDRATE, DEXTROAMPHETAMINE SULFATE AND AMPHETAMINE SULFATE 7.5; 7.5; 7.5; 7.5 MG/1; MG/1; MG/1; MG/1
30 CAPSULE, EXTENDED RELEASE ORAL DAILY
Qty: 30 CAPSULE | Refills: 0 | OUTPATIENT
Start: 2021-06-22

## 2021-06-25 DIAGNOSIS — F90.2 ATTENTION DEFICIT HYPERACTIVITY DISORDER (ADHD), COMBINED TYPE: ICD-10-CM

## 2021-06-25 RX ORDER — DEXTROAMPHETAMINE SACCHARATE, AMPHETAMINE ASPARTATE MONOHYDRATE, DEXTROAMPHETAMINE SULFATE AND AMPHETAMINE SULFATE 7.5; 7.5; 7.5; 7.5 MG/1; MG/1; MG/1; MG/1
30 CAPSULE, EXTENDED RELEASE ORAL DAILY
Qty: 30 CAPSULE | Refills: 0 | Status: SHIPPED | OUTPATIENT
Start: 2021-06-25 | End: 2021-07-21 | Stop reason: SDUPTHER

## 2021-06-25 NOTE — TELEPHONE ENCOUNTER
"Anurag, Generic 6/25/2021 1:43 AM EDT    I actually went twice in March. I ended up doing two blood tests and two pee tests with in not long of each other because the first \"didn't take\" so I had to go back and do both again but the second time I didn't go into the office. Just straight to the lab.   ----- Message -----  From: MARK Greco  Sent: 6/22/21, 10:09 AM  To: Selma Rangel  Subject: RE: Prescription Question    I'm aissatou Hahn but our policy is to see you every 3 mo and we last saw you in Dec.   Shaila      ----- Message -----   From:Selma Rangel   Sent:6/21/2021 10:30 PM EDT   To:MARK Greco   Subject:Prescription Question    Hello. I am not able to come in for an appointment till the 28th. But I did make the appointment. Is there anyway to have my medicine refilled (adderall) tomorrow and then I still come in for my apt? I've been on different adhd add medication since I was 15 years old and have had no history of abusing the medications.  "

## 2021-06-28 RX ORDER — DEXTROAMPHETAMINE SACCHARATE, AMPHETAMINE ASPARTATE MONOHYDRATE, DEXTROAMPHETAMINE SULFATE AND AMPHETAMINE SULFATE 7.5; 7.5; 7.5; 7.5 MG/1; MG/1; MG/1; MG/1
30 CAPSULE, EXTENDED RELEASE ORAL DAILY
Qty: 30 CAPSULE | Refills: 0 | OUTPATIENT
Start: 2021-06-28

## 2021-06-28 NOTE — TELEPHONE ENCOUNTER
Patient cancelled tomorrow and 06/30/21 appt. RX comment states prescription went to wrong pharmacy. Please advise on refill. You were denying it because patient needed an appt and Dr. Quinonez approved it 06/25 but then patient cancelled her appts.

## 2021-06-29 ENCOUNTER — LAB (OUTPATIENT)
Dept: LAB | Facility: HOSPITAL | Age: 31
End: 2021-06-29

## 2021-06-29 ENCOUNTER — PATIENT MESSAGE (OUTPATIENT)
Dept: INTERNAL MEDICINE | Facility: CLINIC | Age: 31
End: 2021-06-29

## 2021-06-29 ENCOUNTER — OFFICE VISIT (OUTPATIENT)
Dept: INTERNAL MEDICINE | Facility: CLINIC | Age: 31
End: 2021-06-29

## 2021-06-29 VITALS
TEMPERATURE: 97.3 F | WEIGHT: 167 LBS | SYSTOLIC BLOOD PRESSURE: 118 MMHG | DIASTOLIC BLOOD PRESSURE: 78 MMHG | BODY MASS INDEX: 23.91 KG/M2 | HEIGHT: 70 IN | HEART RATE: 80 BPM

## 2021-06-29 DIAGNOSIS — E53.8 VITAMIN B 12 DEFICIENCY: ICD-10-CM

## 2021-06-29 DIAGNOSIS — F90.2 ATTENTION DEFICIT HYPERACTIVITY DISORDER (ADHD), COMBINED TYPE: ICD-10-CM

## 2021-06-29 DIAGNOSIS — F90.2 ATTENTION DEFICIT HYPERACTIVITY DISORDER (ADHD), COMBINED TYPE: Primary | ICD-10-CM

## 2021-06-29 DIAGNOSIS — F33.42 RECURRENT MAJOR DEPRESSIVE DISORDER, IN FULL REMISSION (HCC): ICD-10-CM

## 2021-06-29 DIAGNOSIS — E55.9 VITAMIN D DEFICIENCY: ICD-10-CM

## 2021-06-29 PROCEDURE — 80053 COMPREHEN METABOLIC PANEL: CPT

## 2021-06-29 PROCEDURE — 82306 VITAMIN D 25 HYDROXY: CPT

## 2021-06-29 PROCEDURE — 82607 VITAMIN B-12: CPT

## 2021-06-29 PROCEDURE — 99213 OFFICE O/P EST LOW 20 MIN: CPT | Performed by: NURSE PRACTITIONER

## 2021-06-29 NOTE — PROGRESS NOTES
Selma Rangel  1990  [id number]  Patient Care Team:  Shaila Cole APRN as PCP - General (Internal Medicine)    Selma Rangel is a pleasant 30 y.o. female who presents for evaluation of ADD    Chief Complaint   Patient presents with   • ADD       HPI:   She weaned herself off Effexor and Wellbutrin several months ago and is doing well without.  She moved to [place], got a new job, and now engaged.  Overall, she feels she is doing well from a mental health standpoint.  She is here for routine compliance visit for Adderall refills.  She is also due to recheck labs which she will get today.  Past Medical History:   Diagnosis Date   • ADHD (attention deficit hyperactivity disorder) 1/23/1997    Was tested as a child because I would struggle in school   • Arthritis 11/12/2018   • Depression    • Irritable bowel syndrome      Past Surgical History:   Procedure Laterality Date   • COLONOSCOPY       Family History   Problem Relation Age of Onset   • Depression Mother    • Miscarriages / Stillbirths Mother    • Depression Sister      Social History     Tobacco Use   Smoking Status Never Smoker   Smokeless Tobacco Never Used   Tobacco Comment    Never used it. But my boyfriend does sometimes smoke around me.     No Known Allergies    Current Outpatient Medications:   •  acyclovir (ZOVIRAX) 800 MG tablet, Take 1 tablet by mouth 3 (Three) Times a Day As Needed (outbreak)., Disp: 15 tablet, Rfl: 1  •  amphetamine-dextroamphetamine XR (ADDERALL XR) 30 MG 24 hr capsule, Take 1 capsule by mouth Daily, Disp: 30 capsule, Rfl: 0  •  linaclotide (Linzess) 290 MCG capsule capsule, Take 1 capsule by mouth Daily., Disp: 90 capsule, Rfl: 1  •  spironolactone (ALDACTONE) 100 MG tablet, Take 1 tablet by mouth Daily., Disp: 30 tablet, Rfl: 2  •  vitamin B-12 (CYANOCOBALAMIN) 1000 MCG tablet, Take 1 tablet by mouth Daily., Disp: 90 tablet, Rfl: 1  •  vitamin D (ERGOCALCIFEROL) 1.25 MG (03915 UT) capsule capsule, Take 1 capsule  "by mouth 1 (One) Time Per Week., Disp: 12 capsule, Rfl: 1    Review of Systems  /78 (BP Location: Left arm, Patient Position: Sitting, Cuff Size: Adult)   Pulse 80   Temp 97.3 °F (36.3 °C) (Infrared)   Ht 177.8 cm (70\")   Wt 75.8 kg (167 lb)   BMI 23.96 kg/m²     Physical Exam  Constitutional:       Appearance: She is well-developed.   HENT:      Head: Normocephalic and atraumatic.      Comments: *wearing mask  Eyes:      Conjunctiva/sclera: Conjunctivae normal.      Pupils: Pupils are equal, round, and reactive to light.   Cardiovascular:      Rate and Rhythm: Normal rate and regular rhythm.      Pulses: Normal pulses.      Heart sounds: Normal heart sounds.   Pulmonary:      Effort: Pulmonary effort is normal.      Breath sounds: Normal breath sounds.   Musculoskeletal:         General: Normal range of motion.      Cervical back: Normal range of motion and neck supple.   Skin:     General: Skin is warm and dry.   Neurological:      Mental Status: She is alert and oriented to person, place, and time.   Psychiatric:         Mood and Affect: Mood normal.         Behavior: Behavior normal.         Thought Content: Thought content normal.         Judgment: Judgment normal.         Procedures    Results Review:  None    PHQ-9 Total Score: 0    Assessment/Plan:  Diagnoses and all orders for this visit:    1. Attention deficit hyperactivity disorder (ADHD), combined type (Primary)  Comments:  Continue Adderall  Orders:  -     Comprehensive Metabolic Panel; Future    2. Vitamin D deficiency  -     Vitamin D 25 Hydroxy; Future    3. Vitamin B 12 deficiency  -     Vitamin B12; Future    4. Recurrent major depressive disorder, in full remission (CMS/[facility])  Comments:  Doing well off meds    Other orders  -     linaclotide (Linzess) 290 MCG capsule capsule; Take 1 capsule by mouth Daily.  Dispense: 90 capsule; Refill: 1       There are no Patient Instructions on file for this visit.  Plan of care reviewed with patient " at the conclusion of today's visit. Education was provided regarding diagnosis, management and any prescribed or recommended OTC medications.  Patient verbalizes understanding of and agreement with management plan.    Return in about 3 months (around 9/29/2021) for Labs this visit.    *Note that portions of this note were completed with a voice recognition program.  Efforts were made to edit the dictation but occasionally words are transcribed.    Shaila Cole, APRN

## 2021-06-29 NOTE — TELEPHONE ENCOUNTER
From: Selma Rangel  To: MARK Greco  Sent: 6/29/2021 1:29 AM EDT  Subject: Visit Follow-Up Question    I thought I had an apt scheduled for the 29th but now I don't see it on my clarice. I lose my health insurance the end of June so I was trying to come in before then.

## 2021-06-29 NOTE — TELEPHONE ENCOUNTER
LVM that Barbara scheduled an appointment today at 2pm. Advised to call back if this does not work for her.

## 2021-06-30 LAB
25(OH)D3 SERPL-MCNC: 32.7 NG/ML (ref 30–100)
ALBUMIN SERPL-MCNC: 4.3 G/DL (ref 3.5–5.2)
ALBUMIN/GLOB SERPL: 2 G/DL
ALP SERPL-CCNC: 71 U/L (ref 39–117)
ALT SERPL W P-5'-P-CCNC: 14 U/L (ref 1–33)
ANION GAP SERPL CALCULATED.3IONS-SCNC: 9.3 MMOL/L (ref 5–15)
AST SERPL-CCNC: 19 U/L (ref 1–32)
BILIRUB SERPL-MCNC: 0.3 MG/DL (ref 0–1.2)
BUN SERPL-MCNC: 10 MG/DL (ref 6–20)
BUN/CREAT SERPL: 10.5 (ref 7–25)
CALCIUM SPEC-SCNC: 9.2 MG/DL (ref 8.6–10.5)
CHLORIDE SERPL-SCNC: 104 MMOL/L (ref 98–107)
CO2 SERPL-SCNC: 25.7 MMOL/L (ref 22–29)
CREAT SERPL-MCNC: 0.95 MG/DL (ref 0.57–1)
GFR SERPL CREATININE-BSD FRML MDRD: 69 ML/MIN/1.73
GLOBULIN UR ELPH-MCNC: 2.1 GM/DL
GLUCOSE SERPL-MCNC: 83 MG/DL (ref 65–99)
POTASSIUM SERPL-SCNC: 4.5 MMOL/L (ref 3.5–5.2)
PROT SERPL-MCNC: 6.4 G/DL (ref 6–8.5)
SODIUM SERPL-SCNC: 139 MMOL/L (ref 136–145)
VIT B12 BLD-MCNC: 774 PG/ML (ref 211–946)

## 2021-07-21 DIAGNOSIS — F90.2 ATTENTION DEFICIT HYPERACTIVITY DISORDER (ADHD), COMBINED TYPE: ICD-10-CM

## 2021-07-21 RX ORDER — DEXTROAMPHETAMINE SACCHARATE, AMPHETAMINE ASPARTATE MONOHYDRATE, DEXTROAMPHETAMINE SULFATE AND AMPHETAMINE SULFATE 7.5; 7.5; 7.5; 7.5 MG/1; MG/1; MG/1; MG/1
30 CAPSULE, EXTENDED RELEASE ORAL DAILY
Qty: 30 CAPSULE | Refills: 0 | Status: SHIPPED | OUTPATIENT
Start: 2021-07-21 | End: 2021-08-17 | Stop reason: SDUPTHER

## 2021-08-17 DIAGNOSIS — F90.2 ATTENTION DEFICIT HYPERACTIVITY DISORDER (ADHD), COMBINED TYPE: ICD-10-CM

## 2021-08-17 RX ORDER — DEXTROAMPHETAMINE SACCHARATE, AMPHETAMINE ASPARTATE MONOHYDRATE, DEXTROAMPHETAMINE SULFATE AND AMPHETAMINE SULFATE 7.5; 7.5; 7.5; 7.5 MG/1; MG/1; MG/1; MG/1
30 CAPSULE, EXTENDED RELEASE ORAL DAILY
Qty: 30 CAPSULE | Refills: 0 | Status: SHIPPED | OUTPATIENT
Start: 2021-08-17 | End: 2021-09-14 | Stop reason: SDUPTHER

## 2021-08-17 NOTE — TELEPHONE ENCOUNTER
Rx Refill Note  Requested Prescriptions     Pending Prescriptions Disp Refills   • amphetamine-dextroamphetamine XR (ADDERALL XR) 30 MG 24 hr capsule 30 capsule 0     Sig: Take 1 capsule by mouth Daily        Last refill:  7/21/21 #30/0  Last office visit with prescribing clinician: 6/29/21  Next office visit with prescribing clinician: 9/29/21             Cortney Helm RN  08/17/21, 10:53 EDT

## 2021-09-13 DIAGNOSIS — F90.2 ATTENTION DEFICIT HYPERACTIVITY DISORDER (ADHD), COMBINED TYPE: ICD-10-CM

## 2021-09-13 NOTE — TELEPHONE ENCOUNTER
Rx Refill Note  Requested Prescriptions     Pending Prescriptions Disp Refills   • amphetamine-dextroamphetamine XR (ADDERALL XR) 30 MG 24 hr capsule 30 capsule 0     Sig: Take 1 capsule by mouth Daily      Last office visit with prescribing clinician: 6/29/21      Next office visit with prescribing clinician: 9/29/21  LA: 08/17/21 #30 0R             Sendy Ann LPN  09/13/21, 12:54 EDT

## 2021-09-14 DIAGNOSIS — F90.2 ATTENTION DEFICIT HYPERACTIVITY DISORDER (ADHD), COMBINED TYPE: ICD-10-CM

## 2021-09-14 RX ORDER — DEXTROAMPHETAMINE SACCHARATE, AMPHETAMINE ASPARTATE MONOHYDRATE, DEXTROAMPHETAMINE SULFATE AND AMPHETAMINE SULFATE 7.5; 7.5; 7.5; 7.5 MG/1; MG/1; MG/1; MG/1
30 CAPSULE, EXTENDED RELEASE ORAL DAILY
Qty: 30 CAPSULE | Refills: 0 | Status: SHIPPED | OUTPATIENT
Start: 2021-09-14 | End: 2021-09-15 | Stop reason: SDUPTHER

## 2021-09-14 RX ORDER — DEXTROAMPHETAMINE SACCHARATE, AMPHETAMINE ASPARTATE MONOHYDRATE, DEXTROAMPHETAMINE SULFATE AND AMPHETAMINE SULFATE 7.5; 7.5; 7.5; 7.5 MG/1; MG/1; MG/1; MG/1
30 CAPSULE, EXTENDED RELEASE ORAL DAILY
Qty: 30 CAPSULE | Refills: 0 | OUTPATIENT
Start: 2021-09-14

## 2021-09-15 DIAGNOSIS — F90.2 ATTENTION DEFICIT HYPERACTIVITY DISORDER (ADHD), COMBINED TYPE: ICD-10-CM

## 2021-09-15 RX ORDER — DEXTROAMPHETAMINE SACCHARATE, AMPHETAMINE ASPARTATE MONOHYDRATE, DEXTROAMPHETAMINE SULFATE AND AMPHETAMINE SULFATE 7.5; 7.5; 7.5; 7.5 MG/1; MG/1; MG/1; MG/1
30 CAPSULE, EXTENDED RELEASE ORAL DAILY
Qty: 30 CAPSULE | Refills: 0 | Status: SHIPPED | OUTPATIENT
Start: 2021-09-15 | End: 2021-10-30 | Stop reason: SDUPTHER

## 2021-10-01 ENCOUNTER — OFFICE VISIT (OUTPATIENT)
Dept: INTERNAL MEDICINE | Facility: CLINIC | Age: 31
End: 2021-10-01

## 2021-10-01 VITALS
WEIGHT: 173 LBS | TEMPERATURE: 98 F | BODY MASS INDEX: 24.77 KG/M2 | SYSTOLIC BLOOD PRESSURE: 102 MMHG | HEART RATE: 76 BPM | HEIGHT: 70 IN | DIASTOLIC BLOOD PRESSURE: 70 MMHG

## 2021-10-01 DIAGNOSIS — F90.2 ATTENTION DEFICIT HYPERACTIVITY DISORDER (ADHD), COMBINED TYPE: Primary | ICD-10-CM

## 2021-10-01 PROBLEM — K64.4 EXTERNAL HEMORRHOIDS: Status: ACTIVE | Noted: 2021-10-01

## 2021-10-01 PROCEDURE — 99213 OFFICE O/P EST LOW 20 MIN: CPT | Performed by: NURSE PRACTITIONER

## 2021-10-01 RX ORDER — ERGOCALCIFEROL 1.25 MG/1
50000 CAPSULE ORAL WEEKLY
Qty: 12 CAPSULE | Refills: 1 | Status: SHIPPED | OUTPATIENT
Start: 2021-10-01 | End: 2021-10-30 | Stop reason: SDUPTHER

## 2021-10-01 NOTE — PATIENT INSTRUCTIONS
This patient is on a controlled substance which improves symptoms/quality of life and is aware of the risks, benefits and possible side-effects current treatment. The patient denies any medication side-effects at this time. A controlled substance agreement will be obtained or is currently on file. We reviewed required monitoring for controlled substances including but not limited to quarterly follow-up visits, annual depression screening, and urine drug screens to which the patient is agreeable. A MAYA report has been or shortly will be reviewed. There are no signs of deviation or misuse.       Attention Deficit Hyperactivity Disorder, Adult  Attention deficit hyperactivity disorder (ADHD) is a mental health disorder that starts during childhood (neurodevelopmental disorder). For many people with ADHD, the disorder continues into the adult years. Treatment can help you manage your symptoms.  What are the causes?  The exact cause of ADHD is not known. Most experts believe genetics and environmental factors contribute to ADHD.  What increases the risk?  The following factors may make you more likely to develop this condition:  · Having a family history of ADHD.  · Being male.  · Being born to a mother who smoked or drank alcohol during pregnancy.  · Being exposed to lead or other toxins in the womb or early in life.  · Being born before 37 weeks of pregnancy (prematurely) or at a low birth weight.  · Having experienced a brain injury.  What are the signs or symptoms?  Symptoms of this condition depend on the type of ADHD. The two main types are inattentive and hyperactive-impulsive. Some people may have symptoms of both types.  Symptoms of the inattentive type include:  · Difficulty paying attention.  · Making careless mistakes.  · Not following instructions.  · Being disorganized.  · Avoiding tasks that require time and attention.  · Losing and forgetting things.  · Being easily distracted.  Symptoms of the  hyperactive-impulsive type include:  · Restlessness.  · Talking too much.  · Interrupting.  · Difficulty with:  ? Sitting still.  ? Feeling motivated.  ? Relaxing.  ? Waiting in line or waiting for a turn.  In adults, this condition may lead to certain problems, such as:  · Keeping jobs.  · Performing tasks at work.  · Having stable relationships.  · Being on time or keeping to a schedule.  How is this diagnosed?  This condition is diagnosed based on your current symptoms and your history of symptoms. The diagnosis can be made by a health care provider such as a primary care provider or a mental health care specialist.  Your health care provider may use a symptom checklist or a behavior rating scale to evaluate your symptoms. He or she may also want to talk with people who have observed your behaviors throughout your life.  How is this treated?  This condition can be treated with medicines and behavior therapy. Medicines may be the best option to reduce impulsive behaviors and improve attention. Your health care provider may recommend:  · Stimulant medicines. These are the most common medicines used for adult ADHD. They affect certain chemicals in the brain (neurotransmitters) and improve your ability to control your symptoms.  · A non-stimulant medicine for adult ADHD (atomoxetine). This medicine increases a neurotransmitter called norepinephrine. It may take weeks to months to see effects from this medicine.  Counseling and behavioral management are also important for treating ADHD. Counseling is often used along with medicine. Your health care provider may suggest:  · Cognitive behavioral therapy (CBT). This type of therapy teaches you to replace negative thoughts and actions with positive thoughts and actions. When used as part of ADHD treatment, this therapy may also include:  ? Coping strategies for organization, time management, impulse control, and stress reduction.  ? Mindfulness and meditation  training.  · Behavioral management. You may work with a  who is specially trained to help people with ADHD manage and organize activities and function more effectively.  Follow these instructions at home:  Medicines    · Take over-the-counter and prescription medicines only as told by your health care provider.  · Talk with your health care provider about the possible side effects of your medicines and how to manage them.    Lifestyle    · Do not use drugs.  · Do not drink alcohol if:  ? Your health care provider tells you not to drink.  ? You are pregnant, may be pregnant, or are planning to become pregnant.  · If you drink alcohol:  ? Limit how much you use to:  § 0-1 drink a day for women.  § 0-2 drinks a day for men.  ? Be aware of how much alcohol is in your drink. In the [place] one drink equals one 12 oz bottle of beer (355 mL), one 5 oz glass of wine (148 mL), or one 1½ oz glass of hard liquor (44 mL).  · Get enough sleep.  · Eat a healthy diet.  · Exercise regularly. Exercise can help to reduce stress and anxiety.    General instructions  · Learn as much as you can about adult ADHD, and work closely with your health care providers to find the treatments that work best for you.  · Follow the same schedule each day.  · Use reminder devices like notes, calendars, and phone apps to stay on time and organized.  · Keep all follow-up visits as told by your health care provider and therapist. This is important.  Where to find more information  A health care provider may be able to recommend resources that are available online or over the phone. You could start with:  · Attention Deficit Disorder Association (ADDA): www.add.org  · National [place] of Mental Health (NIMH): www.nimh.nih.gov  Contact a health care provider if:  · Your symptoms continue to cause problems.  · You have side effects from your medicine, such as:  ? Repeated muscle twitches, coughing, or speech outbursts.  ? Sleep problems.  ? Loss of  appetite.  ? Dizziness.  ? Unusually fast heartbeat.  ? Stomach pains.  ? Headaches.  · You are struggling with anxiety, depression, or substance abuse.  Get help right away if you:  · Have a severe reaction to a medicine.  If you ever feel like you may hurt yourself or others, or have thoughts about taking your own life, get help right away. You can go to the nearest emergency department or call:  · Your local emergency services (911 in the [place]  · A suicide crisis helpline, such as the National Suicide Prevention Lifeline at 1-759.145.5675. This is open 24 hours a day.  Summary  · ADHD is a mental health disorder that starts during childhood (neurodevelopmental disorder) and often continues into the adult years.  · The exact cause of ADHD is not known. Most experts believe genetics and environmental factors contribute to ADHD.  · There is no cure for ADHD, but treatment with medicine, cognitive behavioral therapy, or behavioral management can help you manage your condition.  This information is not intended to replace advice given to you by your health care provider. Make sure you discuss any questions you have with your health care provider.  Document Revised: 05/11/2020 Document Reviewed: 05/11/2020  [employer] Patient Education © 2021 [employer]

## 2021-10-01 NOTE — PROGRESS NOTES
"Selma Rangel  1990  [id number]  Patient Care Team:  Shaila Cole APRN as PCP - General (Internal Medicine)    Selma Rangel is a pleasant 30 y.o. female who presents for evaluation of ADD (3 month follow up )      Chief Complaint   Patient presents with   • ADD     3 month follow up        HPI:   Here for routine compliance vist for ADHD refills. Engage and living in [place].currently not working  Declines flu vaccine, had syncopal episodes with both Covid vaccines.  Past Medical History:   Diagnosis Date   • ADHD (attention deficit hyperactivity disorder) 1/23/1997    Was tested as a child because I would struggle in school   • Arthritis 11/12/2018   • Depression    • Irritable bowel syndrome      Past Surgical History:   Procedure Laterality Date   • COLONOSCOPY       Family History   Problem Relation Age of Onset   • Depression Mother    • Miscarriages / Stillbirths Mother    • Depression Sister      Social History     Tobacco Use   Smoking Status Never Smoker   Smokeless Tobacco Never Used   Tobacco Comment    Never used it. But my boyfriend does sometimes smoke around me.     No Known Allergies    Current Outpatient Medications:   •  acyclovir (ZOVIRAX) 800 MG tablet, Take 1 tablet by mouth 3 (Three) Times a Day As Needed (outbreak)., Disp: 15 tablet, Rfl: 1  •  amphetamine-dextroamphetamine XR (ADDERALL XR) 30 MG 24 hr capsule, Take 1 capsule by mouth Daily, Disp: 30 capsule, Rfl: 0  •  spironolactone (ALDACTONE) 100 MG tablet, Take 1 tablet by mouth Daily., Disp: 30 tablet, Rfl: 2  •  vitamin B-12 (CYANOCOBALAMIN) 1000 MCG tablet, Take 1 tablet by mouth Daily., Disp: 90 tablet, Rfl: 1  •  vitamin D (ERGOCALCIFEROL) 1.25 MG (39095 UT) capsule capsule, Take 1 capsule by mouth 1 (One) Time Per Week., Disp: 12 capsule, Rfl: 1    Review of Systems  /70 (BP Location: Left arm, Patient Position: Sitting, Cuff Size: Adult)   Pulse 76   Temp 98 °F (36.7 °C) (Temporal)   Ht 177.8 cm (70\")   " Wt 78.5 kg (173 lb)   BMI 24.82 kg/m²     Physical Exam  Vitals reviewed.   Constitutional:       Appearance: She is well-developed.   Pulmonary:      Effort: Pulmonary effort is normal.   Skin:     General: Skin is warm and dry.   Neurological:      Mental Status: She is alert.   Psychiatric:         Mood and Affect: Mood normal.         Behavior: Behavior normal.         Thought Content: Thought content normal.         Judgment: Judgment normal.         Procedures    Results Review:  None    PHQ-9 Total Score:      Assessment/Plan:  Diagnoses and all orders for this visit:    1. Attention deficit hyperactivity disorder (ADHD), combined type (Primary)  Comments:  continue adderall daily    Other orders  -     vitamin D (ERGOCALCIFEROL) 1.25 MG (26184 UT) capsule capsule; Take 1 capsule by mouth 1 (One) Time Per Week.  Dispense: 12 capsule; Refill: 1       Patient Instructions   This patient is on a controlled substance which improves symptoms/quality of life and is aware of the risks, benefits and possible side-effects current treatment. The patient denies any medication side-effects at this time. A controlled substance agreement will be obtained or is currently on file. We reviewed required monitoring for controlled substances including but not limited to quarterly follow-up visits, annual depression screening, and urine drug screens to which the patient is agreeable. A MAYA report has been or shortly will be reviewed. There are no signs of deviation or misuse.       Attention Deficit Hyperactivity Disorder, Adult  Attention deficit hyperactivity disorder (ADHD) is a mental health disorder that starts during childhood (neurodevelopmental disorder). For many people with ADHD, the disorder continues into the adult years. Treatment can help you manage your symptoms.  What are the causes?  The exact cause of ADHD is not known. Most experts believe genetics and environmental factors contribute to ADHD.  What  increases the risk?  The following factors may make you more likely to develop this condition:  · Having a family history of ADHD.  · Being male.  · Being born to a mother who smoked or drank alcohol during pregnancy.  · Being exposed to lead or other toxins in the womb or early in life.  · Being born before 37 weeks of pregnancy (prematurely) or at a low birth weight.  · Having experienced a brain injury.  What are the signs or symptoms?  Symptoms of this condition depend on the type of ADHD. The two main types are inattentive and hyperactive-impulsive. Some people may have symptoms of both types.  Symptoms of the inattentive type include:  · Difficulty paying attention.  · Making careless mistakes.  · Not following instructions.  · Being disorganized.  · Avoiding tasks that require time and attention.  · Losing and forgetting things.  · Being easily distracted.  Symptoms of the hyperactive-impulsive type include:  · Restlessness.  · Talking too much.  · Interrupting.  · Difficulty with:  ? Sitting still.  ? Feeling motivated.  ? Relaxing.  ? Waiting in line or waiting for a turn.  In adults, this condition may lead to certain problems, such as:  · Keeping jobs.  · Performing tasks at work.  · Having stable relationships.  · Being on time or keeping to a schedule.  How is this diagnosed?  This condition is diagnosed based on your current symptoms and your history of symptoms. The diagnosis can be made by a health care provider such as a primary care provider or a mental health care specialist.  Your health care provider may use a symptom checklist or a behavior rating scale to evaluate your symptoms. He or she may also want to talk with people who have observed your behaviors throughout your life.  How is this treated?  This condition can be treated with medicines and behavior therapy. Medicines may be the best option to reduce impulsive behaviors and improve attention. Your health care provider may  recommend:  · Stimulant medicines. These are the most common medicines used for adult ADHD. They affect certain chemicals in the brain (neurotransmitters) and improve your ability to control your symptoms.  · A non-stimulant medicine for adult ADHD (atomoxetine). This medicine increases a neurotransmitter called norepinephrine. It may take weeks to months to see effects from this medicine.  Counseling and behavioral management are also important for treating ADHD. Counseling is often used along with medicine. Your health care provider may suggest:  · Cognitive behavioral therapy (CBT). This type of therapy teaches you to replace negative thoughts and actions with positive thoughts and actions. When used as part of ADHD treatment, this therapy may also include:  ? Coping strategies for organization, time management, impulse control, and stress reduction.  ? Mindfulness and meditation training.  · Behavioral management. You may work with a  who is specially trained to help people with ADHD manage and organize activities and function more effectively.  Follow these instructions at home:  Medicines    · Take over-the-counter and prescription medicines only as told by your health care provider.  · Talk with your health care provider about the possible side effects of your medicines and how to manage them.    Lifestyle    · Do not use drugs.  · Do not drink alcohol if:  ? Your health care provider tells you not to drink.  ? You are pregnant, may be pregnant, or are planning to become pregnant.  · If you drink alcohol:  ? Limit how much you use to:  § 0-1 drink a day for women.  § 0-2 drinks a day for men.  ? Be aware of how much alcohol is in your drink. In the [place] one drink equals one 12 oz bottle of beer (355 mL), one 5 oz glass of wine (148 mL), or one 1½ oz glass of hard liquor (44 mL).  · Get enough sleep.  · Eat a healthy diet.  · Exercise regularly. Exercise can help to reduce stress and anxiety.    General  instructions  · Learn as much as you can about adult ADHD, and work closely with your health care providers to find the treatments that work best for you.  · Follow the same schedule each day.  · Use reminder devices like notes, calendars, and phone apps to stay on time and organized.  · Keep all follow-up visits as told by your health care provider and therapist. This is important.  Where to find more information  A health care provider may be able to recommend resources that are available online or over the phone. You could start with:  · Attention Deficit Disorder Association (ADDA): www.add.org  · National [place] of Mental Health ([facility]): www.nimh.nih.gov  Contact a health care provider if:  · Your symptoms continue to cause problems.  · You have side effects from your medicine, such as:  ? Repeated muscle twitches, coughing, or speech outbursts.  ? Sleep problems.  ? Loss of appetite.  ? Dizziness.  ? Unusually fast heartbeat.  ? Stomach pains.  ? Headaches.  · You are struggling with anxiety, depression, or substance abuse.  Get help right away if you:  · Have a severe reaction to a medicine.  If you ever feel like you may hurt yourself or others, or have thoughts about taking your own life, get help right away. You can go to the nearest emergency department or call:  · Your local emergency services (911 in the [place]  · A suicide crisis helpline, such as the National Suicide Prevention Lifeline at 1-506.267.1158. This is open 24 hours a day.  Summary  · ADHD is a mental health disorder that starts during childhood (neurodevelopmental disorder) and often continues into the adult years.  · The exact cause of ADHD is not known. Most experts believe genetics and environmental factors contribute to ADHD.  · There is no cure for ADHD, but treatment with medicine, cognitive behavioral therapy, or behavioral management can help you manage your condition.  This information is not intended to replace advice given to  you by your health care provider. Make sure you discuss any questions you have with your health care provider.  Document Revised: 05/11/2020 Document Reviewed: 05/11/2020  Elsevier Patient Education © 2021 [employer]      Plan of care reviewed with patient at the conclusion of today's visit. Education was provided regarding diagnosis, management and any prescribed or recommended OTC medications.  Patient verbalizes understanding of and agreement with management plan.    Return in about 3 months (around 1/1/2022).    *Note that portions of this note were completed with a voice recognition program.  Efforts were made to edit the dictation but occasionally words are transcribed.    MARK Greco          Answers for HPI/ROS submitted by the patient on 10/1/2021  Please describe your symptoms.: Every three months i come in for a check up  Have you had these symptoms before?: No  How long have you been having these symptoms?: 1-4 days  What is the primary reason for your visit?: Other

## 2021-10-30 ENCOUNTER — PATIENT MESSAGE (OUTPATIENT)
Dept: INTERNAL MEDICINE | Facility: CLINIC | Age: 31
End: 2021-10-30

## 2021-10-30 DIAGNOSIS — F90.2 ATTENTION DEFICIT HYPERACTIVITY DISORDER (ADHD), COMBINED TYPE: ICD-10-CM

## 2021-11-01 RX ORDER — ERGOCALCIFEROL 1.25 MG/1
50000 CAPSULE ORAL WEEKLY
Qty: 12 CAPSULE | Refills: 1 | Status: SHIPPED | OUTPATIENT
Start: 2021-11-01 | End: 2022-04-20

## 2021-11-01 RX ORDER — DEXTROAMPHETAMINE SACCHARATE, AMPHETAMINE ASPARTATE MONOHYDRATE, DEXTROAMPHETAMINE SULFATE AND AMPHETAMINE SULFATE 7.5; 7.5; 7.5; 7.5 MG/1; MG/1; MG/1; MG/1
30 CAPSULE, EXTENDED RELEASE ORAL DAILY
Qty: 30 CAPSULE | Refills: 0 | Status: SHIPPED | OUTPATIENT
Start: 2021-11-01 | End: 2021-11-08 | Stop reason: SDUPTHER

## 2021-11-01 NOTE — TELEPHONE ENCOUNTER
Rx Refill Note  Requested Prescriptions     Pending Prescriptions Disp Refills   • vitamin D (ERGOCALCIFEROL) 1.25 MG (66181 UT) capsule capsule 12 capsule 1     Sig: Take 1 capsule by mouth 1 (One) Time Per Week.      Last office visit with prescribing clinician: 10/1/2021      Next office visit with prescribing clinician: 1/4/2022            Cortney Helm RN  11/01/21, 10:22 EDT

## 2021-11-01 NOTE — TELEPHONE ENCOUNTER
Rx Refill Note  Requested Prescriptions     Pending Prescriptions Disp Refills   • amphetamine-dextroamphetamine XR (ADDERALL XR) 30 MG 24 hr capsule 30 capsule 0     Sig: Take 1 capsule by mouth Daily      Last office visit with prescribing clinician: 10/01/21      Next office visit with prescribing clinician: 01/04/22    LA: 09/15/21 #30 0R  ISAIAH is on call              Sendy Ann LPN  11/01/21, 09:22 EDT

## 2021-11-08 DIAGNOSIS — F90.2 ATTENTION DEFICIT HYPERACTIVITY DISORDER (ADHD), COMBINED TYPE: ICD-10-CM

## 2021-11-09 RX ORDER — DEXTROAMPHETAMINE SACCHARATE, AMPHETAMINE ASPARTATE MONOHYDRATE, DEXTROAMPHETAMINE SULFATE AND AMPHETAMINE SULFATE 7.5; 7.5; 7.5; 7.5 MG/1; MG/1; MG/1; MG/1
30 CAPSULE, EXTENDED RELEASE ORAL DAILY
Qty: 30 CAPSULE | Refills: 0 | Status: SHIPPED | OUTPATIENT
Start: 2021-11-09 | End: 2021-12-10 | Stop reason: SDUPTHER

## 2021-12-10 DIAGNOSIS — F90.2 ATTENTION DEFICIT HYPERACTIVITY DISORDER (ADHD), COMBINED TYPE: ICD-10-CM

## 2021-12-10 RX ORDER — DEXTROAMPHETAMINE SACCHARATE, AMPHETAMINE ASPARTATE MONOHYDRATE, DEXTROAMPHETAMINE SULFATE AND AMPHETAMINE SULFATE 7.5; 7.5; 7.5; 7.5 MG/1; MG/1; MG/1; MG/1
30 CAPSULE, EXTENDED RELEASE ORAL DAILY
Qty: 30 CAPSULE | Refills: 0 | Status: SHIPPED | OUTPATIENT
Start: 2021-12-10 | End: 2022-01-15 | Stop reason: SDUPTHER

## 2021-12-10 NOTE — TELEPHONE ENCOUNTER
Rx Refill Note  Requested Prescriptions     Pending Prescriptions Disp Refills   • amphetamine-dextroamphetamine XR (ADDERALL XR) 30 MG 24 hr capsule 30 capsule 0     Sig: Take 1 capsule by mouth Daily      Last office visit with prescribing clinician: 10/01/2021      Next office visit with prescribing clinician: 01/04/22    LA: 11/09/21 #30 0R             Sendy Ann LPN  12/10/21, 13:21 EST

## 2022-01-14 ENCOUNTER — OFFICE VISIT (OUTPATIENT)
Dept: INTERNAL MEDICINE | Facility: CLINIC | Age: 32
End: 2022-01-14

## 2022-01-14 VITALS
TEMPERATURE: 97.5 F | WEIGHT: 169 LBS | DIASTOLIC BLOOD PRESSURE: 88 MMHG | BODY MASS INDEX: 24.2 KG/M2 | HEIGHT: 70 IN | HEART RATE: 104 BPM | SYSTOLIC BLOOD PRESSURE: 126 MMHG

## 2022-01-14 DIAGNOSIS — I73.00 RAYNAUD'S PHENOMENON WITHOUT GANGRENE: ICD-10-CM

## 2022-01-14 DIAGNOSIS — F90.2 ATTENTION DEFICIT HYPERACTIVITY DISORDER (ADHD), COMBINED TYPE: Primary | ICD-10-CM

## 2022-01-14 PROCEDURE — 99213 OFFICE O/P EST LOW 20 MIN: CPT | Performed by: NURSE PRACTITIONER

## 2022-01-14 NOTE — PATIENT INSTRUCTIONS
Raynaud's Phenomenon  This video explains what Raynaud's Phenomenon is, what causes it, and protective measures you can take, if you have this condition.  To view the content, go to this web address:  [email or web address]    This video will  on: 2023. If you need access to this video following this date, please reach out to the healthcare provider who assigned it to you.  This information is not intended to replace advice given to you by your health care provider. Make sure you discuss any questions you have with your health care provider.  [employer]’s editorial and clinical teams regularly review and update content to ensure it is up-to-date with changing practice standards and recognized medical guidelines.  Document Revised: 2021  [employer] Patient Education ©  [employer]        Raynaud Phenomenon    Raynaud phenomenon is a condition that affects the blood vessels (arteries) that carry blood to your fingers and toes. The arteries that supply blood to your ears, lips, nipples, or the tip of your nose might also be affected. Raynaud phenomenon causes the arteries to become narrow temporarily (spasm). As a result, the flow of blood to the affected areas is temporarily decreased. This usually occurs in response to cold temperatures or stress. During an attack, the skin in the affected areas turns white, then blue, and finally red. You may also feel tingling or numbness in those areas.  Attacks usually last for only a brief period, and then the blood flow to the area returns to normal. In most cases, Raynaud phenomenon does not cause serious health problems.  What are the causes?  In many cases, the cause of this condition is not known. The condition may occur on its own (primary Raynaud phenomenon) or may be associated with other diseases or factors (secondary Raynaud phenomenon).  Possible causes may include:  · Diseases or medical conditions that damage the arteries.  · Injuries  and repetitive actions that hurt the hands or feet.  · Being exposed to certain chemicals.  · Taking medicines that narrow the arteries.  · Other medical conditions, such as lupus, scleroderma, rheumatoid arthritis, thyroid problems, blood disorders, Sjogren syndrome, or atherosclerosis.  What increases the risk?  The following factors may make you more likely to develop this condition:  · Being 20-40 years old.  · Being female.  · Having a family history of Raynaud phenomenon.  · Living in a cold climate.  · Smoking.  What are the signs or symptoms?  Symptoms of this condition usually occur when you are exposed to cold temperatures or when you have emotional stress. The symptoms may last for a few minutes or up to several hours. They usually affect your fingers but may also affect your toes, nipples, lips, ears, or the tip of your nose. Symptoms may include:  · Changes in skin color. The skin in the affected areas will turn pale or white. The skin may then change from white to bluish to red as normal blood flow returns to the area.  · Numbness, tingling, or pain in the affected areas.  In severe cases, symptoms may include:  · Skin sores.  · Tissues decaying and dying (gangrene).  How is this diagnosed?  This condition may be diagnosed based on:  · Your symptoms and medical history.  · A physical exam. During the exam, you may be asked to put your hands in cold water to check for a reaction to cold temperature.  · Tests, such as:  ? Blood tests to check for other diseases or conditions.  ? A test to check the movement of blood through your arteries and veins (vascular ultrasound).  ? A test in which the skin at the base of your fingernail is examined under a microscope (nailfold capillaroscopy).  How is this treated?  Treatment for this condition often involves making lifestyle changes and taking steps to control your exposure to cold temperatures. For more severe cases, medicine (calcium channel blockers) may be  used to improve blood flow. Surgery is sometimes done to block the nerves that control the affected arteries, but this is rare.  Follow these instructions at home:  Avoiding cold temperatures  Take these steps to avoid exposure to cold:  · If possible, stay indoors during cold weather.  · When you go outside during cold weather, dress in layers and wear mittens, a hat, a scarf, and warm footwear.  · Wear mittens or gloves when handling ice or frozen food.  · Use holders for glasses or cans containing cold drinks.  · Let warm water run for a while before taking a shower or bath.  · Warm up the car before driving in cold weather.  Lifestyle  · If possible, avoid stressful and emotional situations. Try to find ways to manage your stress, such as:  ? Exercise.  ? Yoga.  ? Meditation.  ? Biofeedback.  · Do not use any products that contain nicotine or tobacco, such as cigarettes and e-cigarettes. If you need help quitting, ask your health care provider.  · Avoid secondhand smoke.  · Limit your use of caffeine.  ? Switch to decaffeinated coffee, tea, and soda.  ? Avoid chocolate.  · Avoid vibrating tools and machinery.  General instructions  · Protect your hands and feet from injuries, cuts, or bruises.  · Avoid wearing tight rings or wristbands.  · Wear loose fitting socks and comfortable, roomy shoes.  · Take over-the-counter and prescription medicines only as told by your health care provider.  Contact a health care provider if:  · Your discomfort becomes worse despite lifestyle changes.  · You develop sores on your fingers or toes that do not heal.  · Your fingers or toes turn black.  · You have breaks in the skin on your fingers or toes.  · You have a fever.  · You have pain or swelling in your joints.  · You have a rash.  · Your symptoms occur on only one side of your body.  Summary  · Raynaud phenomenon is a condition that affects the arteries that carry blood to your fingers, toes, ears, lips, nipples, or the tip  of your nose.  · In many cases, the cause of this condition is not known.  · Symptoms of this condition include changes in skin color, and numbness and tingling of the affected area.  · Treatment for this condition includes lifestyle changes, reducing exposure to cold temperatures, and using medicines for severe cases of the condition.  · Contact your health care provider if your condition worsens despite treatment.  This information is not intended to replace advice given to you by your health care provider. Make sure you discuss any questions you have with your health care provider.  Document Revised: 04/29/2021 Document Reviewed: 04/29/2021  [employer] Patient Education © 2021 [employer] Inc.

## 2022-01-14 NOTE — PROGRESS NOTES
Selma Rangel  1990  [id number]  Patient Care Team:  Shaila Cole APRN as PCP - General (Internal Medicine)    Selma Rangel is a pleasant 31 y.o. female who presents for evaluation of ADHD      Chief Complaint   Patient presents with   • ADHD       HPI:   Here for routine compliance vist for ADHD refills. 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 currently not working.    had syncopal episode with J&J Covid vaccine and has not had boosters. Plans to get flu vaccine at pharmacy.    Last few months has noticed numbness and white skin on fingers and toes.  From [place] and moved to [place] about 3 yrs ago.  Photos on phone consistent with Raynauds.  Has been on stimulant for years.  Johnson does keep their house colder than she was used to previously.  Sx are not painful just a nuisance right now.  Past Medical History:   Diagnosis Date   • ADHD (attention deficit hyperactivity disorder) 1/23/1997    Was tested as a child because I would struggle in school   • Arthritis 11/12/2018   • Depression    • Irritable bowel syndrome      Past Surgical History:   Procedure Laterality Date   • COLONOSCOPY       Family History   Problem Relation Age of Onset   • Depression Mother    • Miscarriages / Stillbirths Mother    • Depression Sister      Social History     Tobacco Use   Smoking Status Never Smoker   Smokeless Tobacco Never Used   Tobacco Comment    Never used it. But my boyfriend does sometimes smoke around me.     No Known Allergies    Current Outpatient Medications:   •  acyclovir (ZOVIRAX) 800 MG tablet, Take 1 tablet by mouth 3 (Three) Times a Day As Needed (outbreak)., Disp: 15 tablet, Rfl: 1  •  amphetamine-dextroamphetamine XR (ADDERALL XR) 30 MG 24 hr capsule, Take 1 capsule by mouth Daily, Disp: 30 capsule, Rfl: 0  •  linaclotide (LINZESS) 290 MCG capsule capsule, Take 1 capsule by mouth Every Morning Before Breakfast., Disp: 30 capsule, Rfl: 2  •  spironolactone (ALDACTONE) 100 MG tablet, Take 1 tablet by mouth  "Daily., Disp: 30 tablet, Rfl: 2  •  vitamin B-12 (CYANOCOBALAMIN) 1000 MCG tablet, Take 1 tablet by mouth Daily., Disp: 90 tablet, Rfl: 1  •  vitamin D (ERGOCALCIFEROL) 1.25 MG (52700 UT) capsule capsule, Take 1 capsule by mouth 1 (One) Time Per Week., Disp: 12 capsule, Rfl: 1    Review of Systems  /88 (BP Location: Left arm, Patient Position: Sitting, Cuff Size: Adult)   Pulse 104   Temp 97.5 °F (36.4 °C) (Temporal)   Ht 177.8 cm (70\")   Wt 76.7 kg (169 lb)   BMI 24.25 kg/m²     Physical Exam  Vitals reviewed.   Constitutional:       Appearance: She is well-developed.   HENT:      Head: Normocephalic and atraumatic.      Comments: *wearing mask  Eyes:      Conjunctiva/sclera: Conjunctivae normal.      Pupils: Pupils are equal, round, and reactive to light.   Cardiovascular:      Rate and Rhythm: Normal rate and regular rhythm.      Pulses: Normal pulses.      Heart sounds: Normal heart sounds.   Pulmonary:      Effort: Pulmonary effort is normal.      Breath sounds: Normal breath sounds.   Musculoskeletal:         General: Normal range of motion.      Cervical back: Normal range of motion and neck supple.   Skin:     General: Skin is warm and dry.   Neurological:      Mental Status: She is alert and oriented to person, place, and time.   Psychiatric:         Mood and Affect: Mood normal.         Behavior: Behavior normal.         Thought Content: Thought content normal.         Judgment: Judgment normal.         Procedures    Results Review:  None    PHQ-9 Total Score: 0    Assessment/Plan:  Diagnoses and all orders for this visit:    1. Attention deficit hyperactivity disorder (ADHD), combined type (Primary)  Comments:  continue adderall BID    2. Raynaud's phenomenon without gangrene  Comments:  see below       Patient Instructions   Raynaud's Phenomenon  This video explains what Raynaud's Phenomenon is, what causes it, and protective measures you can take, if you have this condition.  To view the " content, go to this web address:  [email or web address]    This video will  on: 2023. If you need access to this video following this date, please reach out to the healthcare provider who assigned it to you.  This information is not intended to replace advice given to you by your health care provider. Make sure you discuss any questions you have with your health care provider.  [employer]’s editorial and clinical teams regularly review and update content to ensure it is up-to-date with changing practice standards and recognized medical guidelines.  Document Revised: 2021  [employer] Patient Education ©  [employer]        Raynaud Phenomenon    Raynaud phenomenon is a condition that affects the blood vessels (arteries) that carry blood to your fingers and toes. The arteries that supply blood to your ears, lips, nipples, or the tip of your nose might also be affected. Raynaud phenomenon causes the arteries to become narrow temporarily (spasm). As a result, the flow of blood to the affected areas is temporarily decreased. This usually occurs in response to cold temperatures or stress. During an attack, the skin in the affected areas turns white, then blue, and finally red. You may also feel tingling or numbness in those areas.  Attacks usually last for only a brief period, and then the blood flow to the area returns to normal. In most cases, Raynaud phenomenon does not cause serious health problems.  What are the causes?  In many cases, the cause of this condition is not known. The condition may occur on its own (primary Raynaud phenomenon) or may be associated with other diseases or factors (secondary Raynaud phenomenon).  Possible causes may include:  · Diseases or medical conditions that damage the arteries.  · Injuries and repetitive actions that hurt the hands or feet.  · Being exposed to certain chemicals.  · Taking medicines that narrow the arteries.  · Other medical conditions,  such as lupus, scleroderma, rheumatoid arthritis, thyroid problems, blood disorders, Sjogren syndrome, or atherosclerosis.  What increases the risk?  The following factors may make you more likely to develop this condition:  · Being 20-40 years old.  · Being female.  · Having a family history of Raynaud phenomenon.  · Living in a cold climate.  · Smoking.  What are the signs or symptoms?  Symptoms of this condition usually occur when you are exposed to cold temperatures or when you have emotional stress. The symptoms may last for a few minutes or up to several hours. They usually affect your fingers but may also affect your toes, nipples, lips, ears, or the tip of your nose. Symptoms may include:  · Changes in skin color. The skin in the affected areas will turn pale or white. The skin may then change from white to bluish to red as normal blood flow returns to the area.  · Numbness, tingling, or pain in the affected areas.  In severe cases, symptoms may include:  · Skin sores.  · Tissues decaying and dying (gangrene).  How is this diagnosed?  This condition may be diagnosed based on:  · Your symptoms and medical history.  · A physical exam. During the exam, you may be asked to put your hands in cold water to check for a reaction to cold temperature.  · Tests, such as:  ? Blood tests to check for other diseases or conditions.  ? A test to check the movement of blood through your arteries and veins (vascular ultrasound).  ? A test in which the skin at the base of your fingernail is examined under a microscope (nailfold capillaroscopy).  How is this treated?  Treatment for this condition often involves making lifestyle changes and taking steps to control your exposure to cold temperatures. For more severe cases, medicine (calcium channel blockers) may be used to improve blood flow. Surgery is sometimes done to block the nerves that control the affected arteries, but this is rare.  Follow these instructions at  home:  Avoiding cold temperatures  Take these steps to avoid exposure to cold:  · If possible, stay indoors during cold weather.  · When you go outside during cold weather, dress in layers and wear mittens, a hat, a scarf, and warm footwear.  · Wear mittens or gloves when handling ice or frozen food.  · Use holders for glasses or cans containing cold drinks.  · Let warm water run for a while before taking a shower or bath.  · Warm up the car before driving in cold weather.  Lifestyle  · If possible, avoid stressful and emotional situations. Try to find ways to manage your stress, such as:  ? Exercise.  ? Yoga.  ? Meditation.  ? Biofeedback.  · Do not use any products that contain nicotine or tobacco, such as cigarettes and e-cigarettes. If you need help quitting, ask your health care provider.  · Avoid secondhand smoke.  · Limit your use of caffeine.  ? Switch to decaffeinated coffee, tea, and soda.  ? Avoid chocolate.  · Avoid vibrating tools and machinery.  General instructions  · Protect your hands and feet from injuries, cuts, or bruises.  · Avoid wearing tight rings or wristbands.  · Wear loose fitting socks and comfortable, roomy shoes.  · Take over-the-counter and prescription medicines only as told by your health care provider.  Contact a health care provider if:  · Your discomfort becomes worse despite lifestyle changes.  · You develop sores on your fingers or toes that do not heal.  · Your fingers or toes turn black.  · You have breaks in the skin on your fingers or toes.  · You have a fever.  · You have pain or swelling in your joints.  · You have a rash.  · Your symptoms occur on only one side of your body.  Summary  · Raynaud phenomenon is a condition that affects the arteries that carry blood to your fingers, toes, ears, lips, nipples, or the tip of your nose.  · In many cases, the cause of this condition is not known.  · Symptoms of this condition include changes in skin color, and numbness and  tingling of the affected area.  · Treatment for this condition includes lifestyle changes, reducing exposure to cold temperatures, and using medicines for severe cases of the condition.  · Contact your health care provider if your condition worsens despite treatment.  This information is not intended to replace advice given to you by your health care provider. Make sure you discuss any questions you have with your health care provider.  Document Revised: 04/29/2021 Document Reviewed: 04/29/2021  [employer] Patient Education © 2021 [employer]      Plan of care reviewed with patient at the conclusion of today's visit. Education was provided regarding diagnosis, management and any prescribed or recommended OTC medications.  Patient verbalizes understanding of and agreement with management plan.    Return in about 3 months (around 4/14/2022) for Recheck.    Dictated Utilizing Dragon Dictation.    MARK Greco          Answers for HPI/ROS submitted by the patient on 1/14/2022  Please describe your symptoms.: Every three month check up for refills, , My hand and feet turn numb and go white  Have you had these symptoms before?: No  How long have you been having these symptoms?: Greater than 2 weeks  Please describe any probable cause for these symptoms. : Not sure  What is the primary reason for your visit?: Other

## 2022-01-15 DIAGNOSIS — F90.2 ATTENTION DEFICIT HYPERACTIVITY DISORDER (ADHD), COMBINED TYPE: ICD-10-CM

## 2022-01-17 DIAGNOSIS — F90.2 ATTENTION DEFICIT HYPERACTIVITY DISORDER (ADHD), COMBINED TYPE: ICD-10-CM

## 2022-01-17 RX ORDER — DEXTROAMPHETAMINE SACCHARATE, AMPHETAMINE ASPARTATE MONOHYDRATE, DEXTROAMPHETAMINE SULFATE AND AMPHETAMINE SULFATE 7.5; 7.5; 7.5; 7.5 MG/1; MG/1; MG/1; MG/1
30 CAPSULE, EXTENDED RELEASE ORAL DAILY
Qty: 30 CAPSULE | Refills: 0 | Status: SHIPPED | OUTPATIENT
Start: 2022-01-17 | End: 2022-02-16 | Stop reason: SDUPTHER

## 2022-01-17 RX ORDER — DEXTROAMPHETAMINE SACCHARATE, AMPHETAMINE ASPARTATE MONOHYDRATE, DEXTROAMPHETAMINE SULFATE AND AMPHETAMINE SULFATE 7.5; 7.5; 7.5; 7.5 MG/1; MG/1; MG/1; MG/1
30 CAPSULE, EXTENDED RELEASE ORAL DAILY
Qty: 30 CAPSULE | Refills: 0 | Status: SHIPPED | OUTPATIENT
Start: 2022-01-17 | End: 2022-03-23

## 2022-01-17 NOTE — TELEPHONE ENCOUNTER
Rx Refill Note  Requested Prescriptions     Pending Prescriptions Disp Refills   • amphetamine-dextroamphetamine XR (ADDERALL XR) 30 MG 24 hr capsule 30 capsule 0     Sig: Take 1 capsule by mouth Daily      Last office visit with prescribing clinician: 01/14/22     Next office visit with prescribing clinician: 1/17/2022     LA: 12/10/21 #30 0R             Sendy Ann LPN  01/17/22, 08:54 EST

## 2022-02-16 DIAGNOSIS — F90.2 ATTENTION DEFICIT HYPERACTIVITY DISORDER (ADHD), COMBINED TYPE: ICD-10-CM

## 2022-02-17 RX ORDER — DEXTROAMPHETAMINE SACCHARATE, AMPHETAMINE ASPARTATE MONOHYDRATE, DEXTROAMPHETAMINE SULFATE AND AMPHETAMINE SULFATE 7.5; 7.5; 7.5; 7.5 MG/1; MG/1; MG/1; MG/1
30 CAPSULE, EXTENDED RELEASE ORAL DAILY
Qty: 30 CAPSULE | Refills: 0 | Status: SHIPPED | OUTPATIENT
Start: 2022-02-17 | End: 2022-03-23 | Stop reason: SDUPTHER

## 2022-02-17 NOTE — TELEPHONE ENCOUNTER
Rx Refill Note  Requested Prescriptions     Pending Prescriptions Disp Refills   • amphetamine-dextroamphetamine XR (ADDERALL XR) 30 MG 24 hr capsule 30 capsule 0     Sig: Take 1 capsule by mouth Daily     Last refill:  1/17/22 #30/0   Last office visit with prescribing clinician: 1/14/22  Next office visit with prescribing clinician: 4/22/22           Cortney Helm RN  02/17/22, 09:30 EST

## 2022-02-22 RX ORDER — BUPROPION HYDROCHLORIDE 150 MG/1
150 TABLET ORAL DAILY
Qty: 90 TABLET | Refills: 0 | OUTPATIENT
Start: 2022-02-22

## 2022-02-22 NOTE — TELEPHONE ENCOUNTER
Rx Refill Note  Requested Prescriptions     Pending Prescriptions Disp Refills   • buPROPion XL (WELLBUTRIN XL) 150 MG 24 hr tablet [Pharmacy Med Name: BUPROPION XL 150MG TABLETS (24 H)] 90 tablet 0     Sig: TAKE 1 TABLET BY MOUTH DAILY      Last office visit with prescribing clinician: 1/14/2022      Next office visit with prescribing clinician: 4/22/2022            Sendy Ann LPN  02/22/22, 09:05 EST

## 2022-02-22 NOTE — TELEPHONE ENCOUNTER
She has been off this medication for a while.  Can we call and verify that she wants to restart?   PT is a [employer] employee to ED for COVID Testing.c/o diarrhea,vomiting, and headaches. Unknown exposure. PT evaluated by PA. consents for employee health to give results Verbal consent for email/text results given. Verbalized understanding of COVID d/c instructions.

## 2022-03-23 DIAGNOSIS — F90.2 ATTENTION DEFICIT HYPERACTIVITY DISORDER (ADHD), COMBINED TYPE: ICD-10-CM

## 2022-03-23 RX ORDER — DEXTROAMPHETAMINE SACCHARATE, AMPHETAMINE ASPARTATE MONOHYDRATE, DEXTROAMPHETAMINE SULFATE AND AMPHETAMINE SULFATE 7.5; 7.5; 7.5; 7.5 MG/1; MG/1; MG/1; MG/1
30 CAPSULE, EXTENDED RELEASE ORAL DAILY
Qty: 30 CAPSULE | Refills: 0 | Status: SHIPPED | OUTPATIENT
Start: 2022-03-23 | End: 2022-04-28 | Stop reason: SDUPTHER

## 2022-03-23 NOTE — TELEPHONE ENCOUNTER
Rx Refill Note  Requested Prescriptions     Pending Prescriptions Disp Refills   • amphetamine-dextroamphetamine XR (ADDERALL XR) 30 MG 24 hr capsule 30 capsule 0     Sig: Take 1 capsule by mouth Daily      Last office visit with prescribing clinician: 01/14/22     Next office visit with prescribing clinician: 04/22/22    LA:  02/17/22 #30 0R             eSndy Ann LPN  03/23/22, 13:53 EDT

## 2022-04-14 RX ORDER — ACYCLOVIR 800 MG/1
TABLET ORAL
Qty: 15 TABLET | Refills: 1 | Status: SHIPPED | OUTPATIENT
Start: 2022-04-14 | End: 2022-07-11

## 2022-04-20 ENCOUNTER — OFFICE VISIT (OUTPATIENT)
Dept: INTERNAL MEDICINE | Facility: CLINIC | Age: 32
End: 2022-04-20

## 2022-04-20 VITALS
DIASTOLIC BLOOD PRESSURE: 64 MMHG | HEIGHT: 70 IN | HEART RATE: 92 BPM | WEIGHT: 160 LBS | BODY MASS INDEX: 22.9 KG/M2 | SYSTOLIC BLOOD PRESSURE: 118 MMHG | TEMPERATURE: 98.4 F

## 2022-04-20 DIAGNOSIS — F90.2 ATTENTION DEFICIT HYPERACTIVITY DISORDER (ADHD), COMBINED TYPE: Primary | ICD-10-CM

## 2022-04-20 DIAGNOSIS — Z79.899 LONG-TERM USE OF HIGH-RISK MEDICATION: ICD-10-CM

## 2022-04-20 PROCEDURE — 99213 OFFICE O/P EST LOW 20 MIN: CPT | Performed by: NURSE PRACTITIONER

## 2022-04-20 NOTE — PATIENT INSTRUCTIONS
This patient is on a controlled substance which improves symptoms/quality of life and is aware of the risks, benefits and possible side-effects current treatment. The patient denies any medication side-effects at this time. A controlled substance agreement will be obtained or is currently on file. We reviewed required monitoring for controlled substances including but not limited to quarterly follow-up visits, annual depression screening, and urine drug screens to which the patient is agreeable. A MAYA report has been or shortly will be reviewed. There are no signs of deviation or misuse.

## 2022-04-20 NOTE — PROGRESS NOTES
"Selma Waters  1990  [id number]  Patient Care Team:  Shaila Cole APRN as PCP - General (Internal Medicine)    Selma Waters is a pleasant 31 y.o. female who presents for evaluation of ADHD (3 month follow up ) and Mouth Lesions    Chief Complaint   Patient presents with   • ADHD     3 month follow up    • Mouth Lesions       HPI:   Selma is here for a check up at 3 months. Denies any concerns or issues at this time. States that it is working well and she is taking it as prescribed.    Past Medical History:   Diagnosis Date   • ADHD (attention deficit hyperactivity disorder) 1/23/1997    Was tested as a child because I would struggle in school   • Arthritis 11/12/2018   • Depression    • Irritable bowel syndrome      Past Surgical History:   Procedure Laterality Date   • COLONOSCOPY       Family History   Problem Relation Age of Onset   • Depression Mother    • Miscarriages / Stillbirths Mother    • Depression Sister      Social History     Tobacco Use   Smoking Status Never Smoker   Smokeless Tobacco Never Used   Tobacco Comment    Never used it. But my boyfriend does sometimes smoke around me.     No Known Allergies    Current Outpatient Medications:   •  acyclovir (ZOVIRAX) 800 MG tablet, TAKE 1 TABLET BY MOUTH THREE TIMES DAILY AS NEEDED FOR OUTBREAK, Disp: 15 tablet, Rfl: 1  •  amphetamine-dextroamphetamine XR (ADDERALL XR) 30 MG 24 hr capsule, Take 1 capsule by mouth Daily, Disp: 30 capsule, Rfl: 0  •  vitamin B-12 (CYANOCOBALAMIN) 1000 MCG tablet, Take 1 tablet by mouth Daily., Disp: 90 tablet, Rfl: 1    Review of Systems  Review of systems was completed, and pertinent findings are noted in the HPI.  /64 (BP Location: Left arm, Patient Position: Sitting, Cuff Size: Adult)   Pulse 92   Temp 98.4 °F (36.9 °C) (Temporal)   Ht 177.8 cm (70\")   Wt 72.6 kg (160 lb)   BMI 22.96 kg/m²     Physical Exam  Vitals reviewed.   Constitutional:       Appearance: She is well-developed. "   Pulmonary:      Effort: Pulmonary effort is normal.   Neurological:      Mental Status: She is alert.   Psychiatric:         Behavior: Behavior normal.         Procedures    PHQ-9 Total Score:      Assessment/Plan:  Diagnoses and all orders for this visit:    1. Attention deficit hyperactivity disorder (ADHD), combined type (Primary)  Comments:  Adderall 30mg daily prescription renewed. UDS collected.  Orders:  -     Compliance Drug Analysis, Ur - Urine, Clean Catch; Future    2. Long-term use of high-risk medication  -     Compliance Drug Analysis, Ur - Urine, Clean Catch; Future       Patient Instructions   This patient is on a controlled substance which improves symptoms/quality of life and is aware of the risks, benefits and possible side-effects current treatment. The patient denies any medication side-effects at this time. A controlled substance agreement will be obtained or is currently on file. We reviewed required monitoring for controlled substances including but not limited to quarterly follow-up visits, annual depression screening, and urine drug screens to which the patient is agreeable. A MAYA report has been or shortly will be reviewed. There are no signs of deviation or misuse.       Plan of care reviewed with patient at the conclusion of today's visit. Education was provided regarding diagnosis, management and any prescribed or recommended OTC medications.  Patient verbalizes understanding of and agreement with management plan.    Return in about 3 months (around 7/20/2022).    Dictated Utilizing Dragon Dictation.    MARK Greco          Answers for HPI/ROS submitted by the patient on 4/19/2022  Please describe your symptoms.: Check up  Have you had these symptoms before?: Yes  How long have you been having these symptoms?: 1-4 days  Please list any medications you are currently taking for this condition.: Adderall, acyclovier  Please describe any probable cause for these symptoms. :  Cold soars  What is the primary reason for your visit?: Other

## 2022-04-21 ENCOUNTER — TELEPHONE (OUTPATIENT)
Dept: INTERNAL MEDICINE | Facility: CLINIC | Age: 32
End: 2022-04-21

## 2022-04-21 NOTE — TELEPHONE ENCOUNTER
Maria from [facility] called regarding an urine drug screen they received.  She is in need of a test code for the drug screen.    Call Maria back at [phone] ext. [phone]

## 2022-04-22 LAB — Lab: NORMAL

## 2022-04-28 DIAGNOSIS — F90.2 ATTENTION DEFICIT HYPERACTIVITY DISORDER (ADHD), COMBINED TYPE: ICD-10-CM

## 2022-04-28 RX ORDER — DEXTROAMPHETAMINE SACCHARATE, AMPHETAMINE ASPARTATE MONOHYDRATE, DEXTROAMPHETAMINE SULFATE AND AMPHETAMINE SULFATE 7.5; 7.5; 7.5; 7.5 MG/1; MG/1; MG/1; MG/1
30 CAPSULE, EXTENDED RELEASE ORAL DAILY
Qty: 30 CAPSULE | Refills: 0 | Status: SHIPPED | OUTPATIENT
Start: 2022-04-28 | End: 2022-06-04 | Stop reason: SDUPTHER

## 2022-04-30 LAB
DRUGS UR: NORMAL
WRITTEN AUTHORIZATION: NORMAL

## 2022-05-31 RX ORDER — SPIRONOLACTONE 100 MG/1
100 TABLET, FILM COATED ORAL DAILY
Qty: 30 TABLET | Refills: 2 | Status: SHIPPED | OUTPATIENT
Start: 2022-05-31 | End: 2022-09-09

## 2022-05-31 NOTE — TELEPHONE ENCOUNTER
The original prescription was discontinued on 4/20/2022 by Shaila Cole APRN for the following reason: *Therapy completed. Renewing this prescription may not be appropriate.

## 2022-06-04 DIAGNOSIS — F90.2 ATTENTION DEFICIT HYPERACTIVITY DISORDER (ADHD), COMBINED TYPE: ICD-10-CM

## 2022-06-06 RX ORDER — DEXTROAMPHETAMINE SACCHARATE, AMPHETAMINE ASPARTATE MONOHYDRATE, DEXTROAMPHETAMINE SULFATE AND AMPHETAMINE SULFATE 7.5; 7.5; 7.5; 7.5 MG/1; MG/1; MG/1; MG/1
30 CAPSULE, EXTENDED RELEASE ORAL DAILY
Qty: 30 CAPSULE | Refills: 0 | Status: SHIPPED | OUTPATIENT
Start: 2022-06-06 | End: 2022-07-07 | Stop reason: SDUPTHER

## 2022-06-06 NOTE — TELEPHONE ENCOUNTER
There is no Saulo report for me to review in epic.  Please retrieve and route back to me for approval.  Thanks

## 2022-06-06 NOTE — TELEPHONE ENCOUNTER
Rx Refill Note  Requested Prescriptions     Pending Prescriptions Disp Refills   • amphetamine-dextroamphetamine XR (ADDERALL XR) 30 MG 24 hr capsule 30 capsule 0     Sig: Take 1 capsule by mouth Daily      Last office visit with prescribing clinician: 04/20/2022      Next office visit with prescribing clinician: 07/26/22     LA:  04/28/22 #30 0R             Sendy Ann LPN  06/06/22, 09:20 EDT

## 2022-06-14 ENCOUNTER — TELEPHONE (OUTPATIENT)
Dept: INTERNAL MEDICINE | Facility: CLINIC | Age: 32
End: 2022-06-14

## 2022-07-07 DIAGNOSIS — F90.2 ATTENTION DEFICIT HYPERACTIVITY DISORDER (ADHD), COMBINED TYPE: ICD-10-CM

## 2022-07-07 RX ORDER — DEXTROAMPHETAMINE SACCHARATE, AMPHETAMINE ASPARTATE MONOHYDRATE, DEXTROAMPHETAMINE SULFATE AND AMPHETAMINE SULFATE 7.5; 7.5; 7.5; 7.5 MG/1; MG/1; MG/1; MG/1
30 CAPSULE, EXTENDED RELEASE ORAL DAILY
Qty: 30 CAPSULE | Refills: 0 | Status: SHIPPED | OUTPATIENT
Start: 2022-07-07 | End: 2022-08-06 | Stop reason: SDUPTHER

## 2022-07-11 RX ORDER — ACYCLOVIR 800 MG/1
TABLET ORAL
Qty: 15 TABLET | Refills: 1 | Status: SHIPPED | OUTPATIENT
Start: 2022-07-11 | End: 2022-09-09 | Stop reason: SDUPTHER

## 2022-07-11 NOTE — TELEPHONE ENCOUNTER
Rx Refill Note  Requested Prescriptions     Pending Prescriptions Disp Refills   • acyclovir (ZOVIRAX) 800 MG tablet [Pharmacy Med Name: ACYCLOVIR 800MG TABLETS] 15 tablet 1     Sig: TAKE 1 TABLET BY MOUTH THREE TIMES DAILY AS NEEDED FOR OUTBREAK      Last office visit with prescribing clinician: 4/20/2022      Next office visit with prescribing clinician: 7/26/2022            Leland Holly MA  07/11/22, 13:30 EDT

## 2022-07-19 ENCOUNTER — PATIENT MESSAGE (OUTPATIENT)
Dept: INTERNAL MEDICINE | Facility: CLINIC | Age: 32
End: 2022-07-19

## 2022-07-19 RX ORDER — POLYETHYLENE GLYCOL 3350 17 G/17G
17 POWDER, FOR SOLUTION ORAL DAILY PRN
Qty: 30 PACKET | Refills: 2 | Status: SHIPPED | OUTPATIENT
Start: 2022-07-19 | End: 2022-09-09 | Stop reason: SDUPTHER

## 2022-07-19 RX ORDER — HYDROCORTISONE ACETATE 25 MG/1
25 SUPPOSITORY RECTAL 2 TIMES DAILY
Qty: 12 SUPPOSITORY | Refills: 1 | Status: SHIPPED | OUTPATIENT
Start: 2022-07-19 | End: 2022-12-02 | Stop reason: SDUPTHER
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
## 2022-08-06 DIAGNOSIS — F90.2 ATTENTION DEFICIT HYPERACTIVITY DISORDER (ADHD), COMBINED TYPE: ICD-10-CM

## 2022-08-08 RX ORDER — DEXTROAMPHETAMINE SACCHARATE, AMPHETAMINE ASPARTATE MONOHYDRATE, DEXTROAMPHETAMINE SULFATE AND AMPHETAMINE SULFATE 7.5; 7.5; 7.5; 7.5 MG/1; MG/1; MG/1; MG/1
30 CAPSULE, EXTENDED RELEASE ORAL DAILY
Qty: 30 CAPSULE | Refills: 0 | Status: SHIPPED | OUTPATIENT
Start: 2022-08-08 | End: 2022-09-09 | Stop reason: SDUPTHER

## 2022-08-08 NOTE — TELEPHONE ENCOUNTER
Rx Refill Note  Requested Prescriptions     Pending Prescriptions Disp Refills   • amphetamine-dextroamphetamine XR (ADDERALL XR) 30 MG 24 hr capsule 30 capsule 0     Sig: Take 1 capsule by mouth Daily      Last office visit with prescribing clinician: 04/20/2022      Next office visit with prescribing clinician: Visit date not found     LA: 07/07/22 #30 0R             Sendy Ann LPN  08/08/22, 09:29 EDT

## 2022-09-09 DIAGNOSIS — F90.2 ATTENTION DEFICIT HYPERACTIVITY DISORDER (ADHD), COMBINED TYPE: ICD-10-CM

## 2022-09-09 RX ORDER — DEXTROAMPHETAMINE SACCHARATE, AMPHETAMINE ASPARTATE MONOHYDRATE, DEXTROAMPHETAMINE SULFATE AND AMPHETAMINE SULFATE 7.5; 7.5; 7.5; 7.5 MG/1; MG/1; MG/1; MG/1
30 CAPSULE, EXTENDED RELEASE ORAL DAILY
Qty: 30 CAPSULE | Refills: 0 | Status: SHIPPED | OUTPATIENT
Start: 2022-09-09 | End: 2022-10-14

## 2022-09-09 RX ORDER — ACYCLOVIR 400 MG/1
400 TABLET ORAL 3 TIMES DAILY
Qty: 30 TABLET | Refills: 1 | Status: SHIPPED | OUTPATIENT
Start: 2022-09-09

## 2022-09-09 RX ORDER — POLYETHYLENE GLYCOL 3350 17 G/17G
17 POWDER, FOR SOLUTION ORAL DAILY PRN
Qty: 30 PACKET | Refills: 2 | Status: SHIPPED | OUTPATIENT
Start: 2022-09-09

## 2022-09-09 RX ORDER — SPIRONOLACTONE 100 MG/1
100 TABLET, FILM COATED ORAL DAILY
Qty: 30 TABLET | Refills: 2 | Status: SHIPPED | OUTPATIENT
Start: 2022-09-09

## 2022-09-09 RX ORDER — SPIRONOLACTONE 100 MG/1
100 TABLET, FILM COATED ORAL DAILY
Qty: 30 TABLET | Refills: 2 | Status: SHIPPED | OUTPATIENT
Start: 2022-09-09 | End: 2022-09-09

## 2022-09-09 RX ORDER — LANOLIN ALCOHOL/MO/W.PET/CERES
1000 CREAM (GRAM) TOPICAL DAILY
Qty: 90 TABLET | Refills: 1 | Status: SHIPPED | OUTPATIENT
Start: 2022-09-09

## 2022-09-09 NOTE — TELEPHONE ENCOUNTER
Rx Refill Note  Requested Prescriptions     Pending Prescriptions Disp Refills   • amphetamine-dextroamphetamine XR (ADDERALL XR) 30 MG 24 hr capsule 30 capsule 0     Sig: Take 1 capsule by mouth Daily      Last office visit with prescribing clinician: 04/20/22      Next office visit with prescribing clinician: 10/11/22     LA: 08/08/22 #30 0R             Sendy Ann LPN  09/09/22, 09:40 EDT

## 2022-09-09 NOTE — TELEPHONE ENCOUNTER
Rx Refill Note  Requested Prescriptions     Pending Prescriptions Disp Refills   • spironolactone (ALDACTONE) 100 MG tablet [Pharmacy Med Name: SPIRONOLACTONE 100MG TABLETS] 30 tablet 2     Sig: TAKE 1 TABLET BY MOUTH DAILY      Last office visit with prescribing clinician: 4/20/2022      Next office visit with prescribing clinician: 9/9/2022            Cortney Helm RN  09/09/22, 10:26 EDT   22

## 2022-09-09 NOTE — TELEPHONE ENCOUNTER
Rx Refill Note  Requested Prescriptions     Pending Prescriptions Disp Refills   • vitamin B-12 (CYANOCOBALAMIN) 1000 MCG tablet 90 tablet 1     Sig: Take 1 tablet by mouth Daily.   • spironolactone (ALDACTONE) 100 MG tablet 30 tablet 2     Sig: Take 1 tablet by mouth Daily.   • acyclovir (ZOVIRAX) 800 MG tablet 15 tablet 1      Last office visit with prescribing clinician: 4/20/2022      Next office visit with prescribing clinician: 10/11/2022            Cortney Helm RN  09/09/22, 10:27 EDT

## 2022-09-09 NOTE — TELEPHONE ENCOUNTER
Rx Refill Note  Requested Prescriptions     Pending Prescriptions Disp Refills   • polyethylene glycol (MIRALAX) 17 g packet 30 packet 2     Sig: Take 17 g by mouth Daily As Needed (constipation).      Last office visit with prescribing clinician: 4/20/22  Next office visit with prescribing clinician: 10/11/22           Cortney Helm RN  09/09/22, 10:28 EDT

## 2022-10-14 ENCOUNTER — TELEPHONE (OUTPATIENT)
Dept: INTERNAL MEDICINE | Facility: CLINIC | Age: 32
End: 2022-10-14

## 2022-10-14 ENCOUNTER — TELEMEDICINE (OUTPATIENT)
Dept: INTERNAL MEDICINE | Facility: CLINIC | Age: 32
End: 2022-10-14

## 2022-10-14 DIAGNOSIS — F90.2 ATTENTION DEFICIT HYPERACTIVITY DISORDER (ADHD), COMBINED TYPE: Primary | ICD-10-CM

## 2022-10-14 PROCEDURE — 99213 OFFICE O/P EST LOW 20 MIN: CPT | Performed by: NURSE PRACTITIONER

## 2022-10-14 RX ORDER — DEXTROAMPHETAMINE SACCHARATE, AMPHETAMINE ASPARTATE, DEXTROAMPHETAMINE SULFATE AND AMPHETAMINE SULFATE 5; 5; 5; 5 MG/1; MG/1; MG/1; MG/1
20 TABLET ORAL 2 TIMES DAILY
Qty: 60 TABLET | Refills: 0 | Status: SHIPPED | OUTPATIENT
Start: 2022-10-14 | End: 2022-11-07 | Stop reason: SDUPTHER

## 2022-10-14 NOTE — PROGRESS NOTES
Follow Up Office Visit      Patient Name: Selma Huntley  : 1990   MRN: [id number]   Care Team: Patient Care Team:  Shaila Cole APRN as PCP - General (Internal Medicine)    Chief Complaint:    Chief Complaint   Patient presents with   • Med Refill     ADD     You have chosen to receive care through a telehealth visit.  Do you consent to use a video/audio connection for your medical care today?    YES      I am currently located in the office setting [facility].  The patient is in her car in parking lot of [facility].      History of Present Illness: Selma Huntley is a 31 y.o. female with pertinent medical history significant for herpes simplex 1, B12 deficiency, vitamin D deficiency, Raynaud's phenomenon, ADD.    Patient presents today for issue of medication for ADD.  Notes that she recently  and moved to [facility] with her spouse.  She has scheduled an appointment to establish with a new provider in [facility] but was unable to get in for a visit until 2022.     ADD.  Ongoing and chronic since adolescence.  States that in the past she was on Concerta.  Currently she is taking Adderall 30 mg XR daily as prescribed.  However, she is inquiring if she may change to twice daily immediate release as she starts to feel her medication wearing off in the afternoon prior to the end of her workday.  Additionally, she states that the pharmacy is telling her that there is a shortage of the extended release version.    She denies any frequent headaches, blurred vision, palpitations, elevated BP, chest pain, shortness of breath or swelling of the extremities.  Notes that she sleeps well without any issues of disturbance.        Subjective      Review of Systems:   Review of Systems   Psychiatric/Behavioral: Positive for decreased concentration.       I have reviewed and the following portions of the patient's history were updated as appropriate:  past family history, past medical history, past social history, past surgical history and problem list.    Medications:     Current Outpatient Medications:   •  acyclovir (ZOVIRAX) 400 MG tablet, Take 1 tablet by mouth 3 (Three) Times a Day., Disp: 30 tablet, Rfl: 1  •  amphetamine-dextroamphetamine XR (ADDERALL XR) 30 MG 24 hr capsule, Take 1 capsule by mouth Daily, Disp: 30 capsule, Rfl: 0  •  hydrocortisone (ANUSOL-HC) 25 MG suppository, Insert 1 suppository into the rectum 2 (Two) Times a Day., Disp: 12 suppository, Rfl: 1  •  polyethylene glycol (MIRALAX) 17 g packet, Take 17 g by mouth Daily As Needed (constipation)., Disp: 30 packet, Rfl: 2  •  spironolactone (ALDACTONE) 100 MG tablet, Take 1 tablet by mouth Daily., Disp: 30 tablet, Rfl: 2  •  vitamin B-12 (CYANOCOBALAMIN) 1000 MCG tablet, Take 1 tablet by mouth Daily., Disp: 90 tablet, Rfl: 1    Allergies:   No Known Allergies    Objective     Physical Exam:  Vital Signs:   Vitals:    10/14/22 1404   PainSc: 0-No pain     There is no height or weight on file to calculate BMI.     Physical Exam  Constitutional: No acute distress, awake, alert, nontoxic, normal body habitus  HENT: NCAT, MMM, no conjunctival injection  Respiratory: Good effort, nonlabored respirations   Psychiatric: Appropriate affect, good insight and judgement, cooperative  Neurologic: Oriented x 3, speech clear and fluent  Skin: No visible rashes, no jaundice seen on exposed skin through video window    Assessment / Plan      Assessment/Plan:   Problems Addressed This Visit    ICD-10-CM ICD-9-CM   1. Attention deficit hyperactivity disorder (ADHD), combined type  F90.2 314.01        ADD  -Plan to discontinue Adderall XR 30 mg  -Start Adderall 20 mg twice daily  -Discussed importance of taking drug holidays when possible  -Noted that patient will be establishing with new provider in December 2022.    Plan of care reviewed with patient at the conclusion of today's visit. Education was provided  regarding diagnosis and management.  Patient verbalizes understanding of and agreement with management plan.    There are no Patient Instructions on file for this visit.    Follow Up:   No follow-ups on file.        MARK Goins  [facility] [address] [facility]    Please note that portions of this note were completed with a voice recognition program.

## 2022-10-14 NOTE — TELEPHONE ENCOUNTER
Caller: Selma Huntley    Relationship to patient: Self    Best call back number:[phone]    Chief complaint: MED REFILL APPOINTMENT NEEDED BEFORE HER CRUISE     Type of visit: REQUESTING TELEHEALTH    Requested date: 11/07/22     If rescheduling, when is the original appointment: NA     Additional notes: PATIENT IS LEAVING FOR A CRUISE ON 11/13 AND HER MEDICATION REFILLS AREN'T DUE UNTIL 11/15/22. PATIENT WOULD LIKE TO KNOW IF SHE CAN CHANGE HER OFFICE APPOINTMENT TO A TELEHEALTH AND IF SHE WOULD BE ABLE TO GET THAT MEDICATION FILLED BEFORE THE 11/15. IF NOT ARE THERE ANY SAMPLES TO PROVIDE TO GET HER THROUGH HER TRIP.TRIP

## 2022-10-14 NOTE — TELEPHONE ENCOUNTER
Saw patient via A/V visit today.  Appropriate for Adderall refill.  Noted change dosage from extended release to twice daily immediate release.

## 2022-10-17 NOTE — TELEPHONE ENCOUNTER
This is not a medication that can be authorized as an early refill.  I would recommend that she try to take days off from her medication (such as on the weekend) and therefore will have some extra doses on her vacation.

## 2022-10-17 NOTE — TELEPHONE ENCOUNTER
I called the patient but phone went straight to voicemail. Based off the previous message she should have already left for her cruise.

## 2022-11-07 ENCOUNTER — TELEMEDICINE (OUTPATIENT)
Dept: INTERNAL MEDICINE | Facility: CLINIC | Age: 32
End: 2022-11-07

## 2022-11-07 DIAGNOSIS — F90.2 ATTENTION DEFICIT HYPERACTIVITY DISORDER (ADHD), COMBINED TYPE: ICD-10-CM

## 2022-11-07 DIAGNOSIS — F90.2 ATTENTION DEFICIT HYPERACTIVITY DISORDER (ADHD), COMBINED TYPE: Primary | ICD-10-CM

## 2022-11-07 PROCEDURE — 99213 OFFICE O/P EST LOW 20 MIN: CPT | Performed by: NURSE PRACTITIONER

## 2022-11-07 RX ORDER — DEXTROAMPHETAMINE SACCHARATE, AMPHETAMINE ASPARTATE, DEXTROAMPHETAMINE SULFATE AND AMPHETAMINE SULFATE 5; 5; 5; 5 MG/1; MG/1; MG/1; MG/1
20 TABLET ORAL 2 TIMES DAILY
Qty: 60 TABLET | Refills: 0 | Status: SHIPPED | OUTPATIENT
Start: 2022-11-16 | End: 2022-11-10 | Stop reason: SDUPTHER

## 2022-11-07 NOTE — PROGRESS NOTES
"  Follow Up Office Visit      Patient Name: Selma Huntley  : 1990   MRN: [id number]   Care Team: Patient Care Team:  Shaila Cole APRN as PCP - General (Internal Medicine)    Chief Complaint:    Chief Complaint   Patient presents with   • Med Refill     You have chosen to receive care through a telehealth visit.  Do you consent to use a video/audio connection for your medical care today?     YES    The patient is located in her living room in [facility].  I am located in the office setting at [address] [place] [address].      History of Present Illness: Selma Huntley is a 31 y.o. female with pertinent medical history significant for attention deficit disorder with hyperactivity.    She presents today via A/V visit for follow-up on ADHD.  At her last visit, we changed her medication therapy from Adderall Exar 30 mg daily to Adderall immediate release 20 mg twice daily.  She states that she \"likes\" this dosage as she feels her symptoms are well controlled and she has the ability to adjust whether or not she needs the evening dose in full or half.    States that she is going on her honeymoon soon, on the 12th of this month and inquiring how early she can have her medication filled.    He has no other health concerns to discuss.    She denies any issues with frequent headaches, blurred vision, chest pain, palpitations or difficulty with sleep.  Notes that she just awoke (it is noon) .  Notes that she stayed up late last night.    Subjective        I have reviewed and the following portions of the patient's history were updated as appropriate: past family history, past medical history, past social history, past surgical history and problem list.    Medications:     Current Outpatient Medications:   •  [START ON 2022] amphetamine-dextroamphetamine (Adderall) 20 MG tablet, Take 1 tablet by mouth 2 (Two) Times a Day., Disp: 60 tablet, Rfl: 0  •  hydrocortisone (ANUSOL-HC) 25 " MG suppository, Insert 1 suppository into the rectum 2 (Two) Times a Day., Disp: 12 suppository, Rfl: 1  •  polyethylene glycol (MIRALAX) 17 g packet, Take 17 g by mouth Daily As Needed (constipation)., Disp: 30 packet, Rfl: 2  •  spironolactone (ALDACTONE) 100 MG tablet, Take 1 tablet by mouth Daily., Disp: 30 tablet, Rfl: 2  •  vitamin B-12 (CYANOCOBALAMIN) 1000 MCG tablet, Take 1 tablet by mouth Daily., Disp: 90 tablet, Rfl: 1  •  acyclovir (ZOVIRAX) 400 MG tablet, Take 1 tablet by mouth 3 (Three) Times a Day., Disp: 30 tablet, Rfl: 1    Allergies:   No Known Allergies    Objective     Physical Exam:  Vital Signs:   Vitals:    11/07/22 1254   PainSc: 0-No pain     There is no height or weight on file to calculate BMI.     Physical Exam  Constitutional: No acute distress, awake, alert, nontoxic, normal body habitus  HENT: NCAT, no conjunctival injection  Respiratory: Good effort, nonlabored respirations   Psychiatric: Appropriate affect, good insight and judgement, cooperative  Neurologic: Oriented x 3, movements symmetric BUE and BLE, speech clear and fluent  Skin: No visible rashes, no jaundice seen on exposed skin through video window    Assessment / Plan      Assessment/Plan:   Problems Addressed This Visit    ICD-10-CM ICD-9-CM   1. Attention deficit hyperactivity disorder (ADHD), combined type  F90.2 314.01      ADHD  -Plan to continue Adderall 20 mg twice daily  -Reviewed Saulo, appropriate for refills on    Plan of care reviewed with patient at the conclusion of today's visit. Education was provided regarding diagnosis and management.  Patient verbalizes understanding of and agreement with management plan.      Follow Up:   Return if symptoms worsen or fail to improve.        MARK Goins  [facility] [address]    Please note that portions of this note were completed with a voice recognition program.

## 2022-11-07 NOTE — TELEPHONE ENCOUNTER
Saw patient via video visit today 11/7/22.  Saulo reviewed and appropriate for refill of medication on 11/16/2022, defined in pended order.

## 2022-11-09 DIAGNOSIS — F90.2 ATTENTION DEFICIT HYPERACTIVITY DISORDER (ADHD), COMBINED TYPE: ICD-10-CM

## 2022-11-10 RX ORDER — DEXTROAMPHETAMINE SACCHARATE, AMPHETAMINE ASPARTATE, DEXTROAMPHETAMINE SULFATE AND AMPHETAMINE SULFATE 5; 5; 5; 5 MG/1; MG/1; MG/1; MG/1
20 TABLET ORAL 2 TIMES DAILY
Qty: 60 TABLET | Refills: 0 | OUTPATIENT
Start: 2022-11-16

## 2022-11-10 RX ORDER — DEXTROAMPHETAMINE SACCHARATE, AMPHETAMINE ASPARTATE, DEXTROAMPHETAMINE SULFATE AND AMPHETAMINE SULFATE 5; 5; 5; 5 MG/1; MG/1; MG/1; MG/1
20 TABLET ORAL 2 TIMES DAILY
Qty: 60 TABLET | Refills: 0 | Status: SHIPPED | OUTPATIENT
Start: 2022-11-16 | End: 2022-12-16 | Stop reason: SDUPTHER

## 2022-12-02 ENCOUNTER — OFFICE VISIT (OUTPATIENT)
Dept: FAMILY MEDICINE CLINIC | Facility: CLINIC | Age: 32
End: 2022-12-02

## 2022-12-02 VITALS
OXYGEN SATURATION: 100 % | HEART RATE: 80 BPM | BODY MASS INDEX: 20.01 KG/M2 | SYSTOLIC BLOOD PRESSURE: 114 MMHG | DIASTOLIC BLOOD PRESSURE: 68 MMHG | TEMPERATURE: 97.6 F | HEIGHT: 70 IN | WEIGHT: 139.8 LBS

## 2022-12-02 DIAGNOSIS — K64.4 EXTERNAL HEMORRHOIDS: ICD-10-CM

## 2022-12-02 DIAGNOSIS — S02.2XXA CLOSED FRACTURE OF NASAL BONE, INITIAL ENCOUNTER: ICD-10-CM

## 2022-12-02 DIAGNOSIS — Z00.00 PHYSICAL EXAM, ANNUAL: Primary | ICD-10-CM

## 2022-12-02 DIAGNOSIS — F90.2 ATTENTION DEFICIT HYPERACTIVITY DISORDER (ADHD), COMBINED TYPE: ICD-10-CM

## 2022-12-02 PROBLEM — K58.1 IRRITABLE BOWEL SYNDROME WITH CONSTIPATION: Status: ACTIVE | Noted: 2022-12-02

## 2022-12-02 PROCEDURE — 99395 PREV VISIT EST AGE 18-39: CPT | Performed by: INTERNAL MEDICINE

## 2022-12-02 RX ORDER — HYDROCORTISONE ACETATE 25 MG/1
25 SUPPOSITORY RECTAL 2 TIMES DAILY
Qty: 14 SUPPOSITORY | Refills: 1 | Status: SHIPPED | OUTPATIENT
Start: 2022-12-02

## 2022-12-02 RX ORDER — FLUTICASONE PROPIONATE 50 MCG
2 SPRAY, SUSPENSION (ML) NASAL DAILY
Qty: 18.2 ML | Refills: 3 | Status: SHIPPED | OUTPATIENT
Start: 2022-12-02 | End: 2023-03-22 | Stop reason: SDUPTHER

## 2022-12-02 RX ORDER — ERGOCALCIFEROL 1.25 MG/1
50000 CAPSULE ORAL WEEKLY
COMMUNITY
Start: 2022-10-15 | End: 2023-01-26

## 2022-12-02 NOTE — PROGRESS NOTES
"Chief Complaint  new pcp, ADHD, Hemorrhoids (refferal), and nose hurt/sinuse drainage (Referral ent)    Subjective        Selma Huntley presents to [facility]  History of Present Illness patient was seen for a physical.  Patient's diet and physical activity were discussed at this visit.    Dictated utilizing Dragon dictation. If there are questions or for further clarification, please contact me.    Objective   Vital Signs:  Blood Pressure 114/68   Pulse 80   Temperature 97.6 °F (36.4 °C)   Height 177.8 cm (70\")   Weight 63.4 kg (139 lb 12.8 oz)   Oxygen Saturation 100%   Body Mass Index 20.06 kg/m²   Estimated body mass index is 20.06 kg/m² as calculated from the following:    Height as of this encounter: 177.8 cm (70\").    Weight as of this encounter: 63.4 kg (139 lb 12.8 oz).    BMI is within normal parameters. No other follow-up for BMI required.      Physical Exam  Vitals and nursing note reviewed.   Constitutional:       General: She is not in acute distress.     Appearance: Normal appearance. She is well-developed. She is not ill-appearing, toxic-appearing or diaphoretic.   HENT:      Head: Normocephalic and atraumatic.      Right Ear: Tympanic membrane, ear canal and external ear normal. There is no impacted cerumen.      Left Ear: Tympanic membrane, ear canal and external ear normal. There is no impacted cerumen.      Nose: Nose normal. No congestion or rhinorrhea.      Mouth/Throat:      Mouth: Mucous membranes are moist.      Pharynx: Oropharynx is clear. No oropharyngeal exudate or posterior oropharyngeal erythema.   Eyes:      General: No scleral icterus.        Right eye: No discharge.         Left eye: No discharge.      Extraocular Movements: Extraocular movements intact.      Conjunctiva/sclera: Conjunctivae normal.      Pupils: Pupils are equal, round, and reactive to light.   Neck:      Thyroid: No thyromegaly.      Vascular: No carotid bruit or JVD.      " Trachea: No tracheal deviation.   Cardiovascular:      Rate and Rhythm: Normal rate and regular rhythm.      Heart sounds: Normal heart sounds. No murmur heard.    No friction rub. No gallop.   Pulmonary:      Effort: Pulmonary effort is normal. No respiratory distress.      Breath sounds: Normal breath sounds. No stridor. No wheezing, rhonchi or rales.   Chest:      Chest wall: No tenderness.   Abdominal:      General: Bowel sounds are normal. There is no distension.      Palpations: Abdomen is soft. There is no mass.      Tenderness: There is no abdominal tenderness. There is no right CVA tenderness, left CVA tenderness, guarding or rebound.      Hernia: No hernia is present.   Musculoskeletal:         General: No swelling, tenderness, deformity or signs of injury. Normal range of motion.      Cervical back: Normal range of motion and neck supple. No rigidity. No muscular tenderness.      Right lower leg: No edema.      Left lower leg: No edema.   Lymphadenopathy:      Cervical: No cervical adenopathy.   Skin:     General: Skin is warm and dry.      Coloration: Skin is not jaundiced or pale.      Findings: No bruising, erythema, lesion or rash.   Neurological:      General: No focal deficit present.      Mental Status: She is alert and oriented to person, place, and time. Mental status is at baseline.      Cranial Nerves: No cranial nerve deficit.      Sensory: No sensory deficit.      Motor: No weakness or abnormal muscle tone.      Coordination: Coordination normal.      Gait: Gait normal.      Deep Tendon Reflexes: Reflexes normal.   Psychiatric:         Mood and Affect: Mood normal.         Behavior: Behavior normal.         Thought Content: Thought content normal.         Judgment: Judgment normal.        Result Review :                Assessment and Plan  #1 physical #2 labs  Diagnoses and all orders for this visit:    1. Physical exam, annual (Primary)  -     Comprehensive Metabolic Panel  -     CBC (No  Diff)  -     Lipid Panel  -     Vitamin D,25-Hydroxy    2. External hemorrhoids    3. Closed fracture of nasal bone, initial encounter  -     Cancel: XR nasal bones; Future  -     Cancel: XR nasal bones; Future  -     XR nasal bones; Future  -     Ambulatory Referral to ENT (Otolaryngology)    4. Attention deficit hyperactivity disorder (ADHD), combined type    Other orders  -     hydrocortisone (ANUSOL-HC) 25 MG suppository; Insert 1 suppository into the rectum 2 (Two) Times a Day.  Dispense: 14 suppository; Refill: 1  -     fluticasone (FLONASE) 50 MCG/ACT nasal spray; 2 sprays into the nostril(s) as directed by provider Daily.  Dispense: 18.2 mL; Refill: 3             Follow Up   Return in about 4 months (around 4/2/2023), or if symptoms worsen or fail to improve, for Recheck.  Patient was given instructions and counseling regarding her condition or for health maintenance advice. Please see specific information pulled into the AVS if appropriate.

## 2022-12-03 LAB
25(OH)D3+25(OH)D2 SERPL-MCNC: 44.9 NG/ML (ref 30–100)
ALBUMIN SERPL-MCNC: 4.6 G/DL (ref 3.8–4.8)
ALBUMIN/GLOB SERPL: 2 {RATIO} (ref 1.2–2.2)
ALP SERPL-CCNC: 72 IU/L (ref 44–121)
ALT SERPL-CCNC: 13 IU/L (ref 0–32)
AST SERPL-CCNC: 16 IU/L (ref 0–40)
BILIRUB SERPL-MCNC: 0.5 MG/DL (ref 0–1.2)
BUN SERPL-MCNC: 9 MG/DL (ref 6–20)
BUN/CREAT SERPL: 11 (ref 9–23)
CALCIUM SERPL-MCNC: 9.8 MG/DL (ref 8.7–10.2)
CHLORIDE SERPL-SCNC: 104 MMOL/L (ref 96–106)
CHOLEST SERPL-MCNC: 174 MG/DL (ref 100–199)
CO2 SERPL-SCNC: 25 MMOL/L (ref 20–29)
CREAT SERPL-MCNC: 0.8 MG/DL (ref 0.57–1)
EGFRCR SERPLBLD CKD-EPI 2021: 100 ML/MIN/1.73
ERYTHROCYTE [DISTWIDTH] IN BLOOD BY AUTOMATED COUNT: 12.3 % (ref 11.7–15.4)
GLOBULIN SER CALC-MCNC: 2.3 G/DL (ref 1.5–4.5)
GLUCOSE SERPL-MCNC: 93 MG/DL (ref 70–99)
HCT VFR BLD AUTO: 42.5 % (ref 34–46.6)
HDLC SERPL-MCNC: 62 MG/DL
HGB BLD-MCNC: 14.6 G/DL (ref 11.1–15.9)
LDLC SERPL CALC-MCNC: 101 MG/DL (ref 0–99)
MCH RBC QN AUTO: 30.6 PG (ref 26.6–33)
MCHC RBC AUTO-ENTMCNC: 34.4 G/DL (ref 31.5–35.7)
MCV RBC AUTO: 89 FL (ref 79–97)
PLATELET # BLD AUTO: 380 X10E3/UL (ref 150–450)
POTASSIUM SERPL-SCNC: 4.5 MMOL/L (ref 3.5–5.2)
PROT SERPL-MCNC: 6.9 G/DL (ref 6–8.5)
RBC # BLD AUTO: 4.77 X10E6/UL (ref 3.77–5.28)
SODIUM SERPL-SCNC: 142 MMOL/L (ref 134–144)
TRIGL SERPL-MCNC: 56 MG/DL (ref 0–149)
VLDLC SERPL CALC-MCNC: 11 MG/DL (ref 5–40)
WBC # BLD AUTO: 5.6 X10E3/UL (ref 3.4–10.8)

## 2022-12-13 ENCOUNTER — HOSPITAL ENCOUNTER (OUTPATIENT)
Dept: GENERAL RADIOLOGY | Facility: HOSPITAL | Age: 32
Discharge: HOME | End: 2022-12-13
Admitting: INTERNAL MEDICINE

## 2022-12-13 DIAGNOSIS — S02.2XXA CLOSED FRACTURE OF NASAL BONE, INITIAL ENCOUNTER: ICD-10-CM

## 2022-12-13 PROCEDURE — 70160 X-RAY EXAM OF NASAL BONES: CPT

## 2022-12-16 DIAGNOSIS — F90.2 ATTENTION DEFICIT HYPERACTIVITY DISORDER (ADHD), COMBINED TYPE: ICD-10-CM

## 2022-12-16 RX ORDER — DEXTROAMPHETAMINE SACCHARATE, AMPHETAMINE ASPARTATE, DEXTROAMPHETAMINE SULFATE AND AMPHETAMINE SULFATE 5; 5; 5; 5 MG/1; MG/1; MG/1; MG/1
20 TABLET ORAL 2 TIMES DAILY
Qty: 60 TABLET | Refills: 0 | Status: SHIPPED | OUTPATIENT
Start: 2022-12-16 | End: 2022-12-20 | Stop reason: SDUPTHER

## 2022-12-20 DIAGNOSIS — F90.2 ATTENTION DEFICIT HYPERACTIVITY DISORDER (ADHD), COMBINED TYPE: ICD-10-CM

## 2022-12-20 RX ORDER — DEXTROAMPHETAMINE SACCHARATE, AMPHETAMINE ASPARTATE, DEXTROAMPHETAMINE SULFATE AND AMPHETAMINE SULFATE 5; 5; 5; 5 MG/1; MG/1; MG/1; MG/1
20 TABLET ORAL 2 TIMES DAILY
Qty: 60 TABLET | Refills: 0 | Status: SHIPPED | OUTPATIENT
Start: 2022-12-20 | End: 2023-01-23 | Stop reason: SDUPTHER

## 2023-01-23 DIAGNOSIS — F90.2 ATTENTION DEFICIT HYPERACTIVITY DISORDER (ADHD), COMBINED TYPE: ICD-10-CM

## 2023-01-23 RX ORDER — DEXTROAMPHETAMINE SACCHARATE, AMPHETAMINE ASPARTATE, DEXTROAMPHETAMINE SULFATE AND AMPHETAMINE SULFATE 7.5; 7.5; 7.5; 7.5 MG/1; MG/1; MG/1; MG/1
30 TABLET ORAL DAILY
Qty: 30 TABLET | Refills: 0 | Status: SHIPPED | OUTPATIENT
Start: 2023-01-23 | End: 2023-02-24 | Stop reason: DRUGHIGH

## 2023-01-23 RX ORDER — DEXTROAMPHETAMINE SACCHARATE, AMPHETAMINE ASPARTATE, DEXTROAMPHETAMINE SULFATE AND AMPHETAMINE SULFATE 5; 5; 5; 5 MG/1; MG/1; MG/1; MG/1
20 TABLET ORAL 2 TIMES DAILY
Qty: 60 TABLET | Refills: 0 | Status: SHIPPED | OUTPATIENT
Start: 2023-01-23 | End: 2023-01-23 | Stop reason: DRUGHIGH

## 2023-01-26 RX ORDER — ERGOCALCIFEROL 1.25 MG/1
CAPSULE ORAL
Qty: 12 CAPSULE | Refills: 3 | Status: SHIPPED | OUTPATIENT
Start: 2023-01-26

## 2023-01-26 RX ORDER — LINACLOTIDE 290 UG/1
CAPSULE, GELATIN COATED ORAL
Qty: 30 CAPSULE | Refills: 2 | Status: SHIPPED | OUTPATIENT
Start: 2023-01-26 | End: 2023-03-22 | Stop reason: SDUPTHER

## 2023-01-26 RX ORDER — ACYCLOVIR 800 MG/1
TABLET ORAL
Qty: 15 TABLET | Refills: 1 | Status: SHIPPED | OUTPATIENT
Start: 2023-01-26

## 2023-01-27 ENCOUNTER — PRIOR AUTHORIZATION (OUTPATIENT)
Dept: FAMILY MEDICINE CLINIC | Facility: CLINIC | Age: 33
End: 2023-01-27
Payer: COMMERCIAL

## 2023-01-27 RX ORDER — POLYETHYLENE GLYCOL 3350 17 G/17G
POWDER, FOR SOLUTION ORAL
Qty: 30 EACH | OUTPATIENT
Start: 2023-01-27

## 2023-01-30 RX ORDER — SPIRONOLACTONE 100 MG/1
100 TABLET, FILM COATED ORAL DAILY
Qty: 30 TABLET | Refills: 2 | OUTPATIENT
Start: 2023-01-30

## 2023-02-24 RX ORDER — DEXTROAMPHETAMINE SACCHARATE, AMPHETAMINE ASPARTATE, DEXTROAMPHETAMINE SULFATE AND AMPHETAMINE SULFATE 2.5; 2.5; 2.5; 2.5 MG/1; MG/1; MG/1; MG/1
10 TABLET ORAL DAILY
Qty: 30 TABLET | Refills: 0 | Status: SHIPPED | OUTPATIENT
Start: 2023-02-24 | End: 2023-02-28 | Stop reason: DRUGHIGH

## 2023-02-24 RX ORDER — DEXTROAMPHETAMINE SACCHARATE, AMPHETAMINE ASPARTATE, DEXTROAMPHETAMINE SULFATE AND AMPHETAMINE SULFATE 7.5; 7.5; 7.5; 7.5 MG/1; MG/1; MG/1; MG/1
30 TABLET ORAL DAILY
Qty: 30 TABLET | Refills: 0 | Status: SHIPPED | OUTPATIENT
Start: 2023-02-24 | End: 2023-02-28 | Stop reason: SDUPTHER

## 2023-02-28 RX ORDER — DEXTROAMPHETAMINE SACCHARATE, AMPHETAMINE ASPARTATE, DEXTROAMPHETAMINE SULFATE AND AMPHETAMINE SULFATE 7.5; 7.5; 7.5; 7.5 MG/1; MG/1; MG/1; MG/1
30 TABLET ORAL DAILY
Qty: 30 TABLET | Refills: 0 | Status: SHIPPED | OUTPATIENT
Start: 2023-02-28 | End: 2023-03-22 | Stop reason: SDUPTHER

## 2023-03-22 RX ORDER — DEXTROAMPHETAMINE SACCHARATE, AMPHETAMINE ASPARTATE, DEXTROAMPHETAMINE SULFATE AND AMPHETAMINE SULFATE 7.5; 7.5; 7.5; 7.5 MG/1; MG/1; MG/1; MG/1
30 TABLET ORAL DAILY
Qty: 30 TABLET | Refills: 0 | Status: SHIPPED | OUTPATIENT
Start: 2023-03-22 | End: 2023-03-24 | Stop reason: SDUPTHER

## 2023-03-22 RX ORDER — FLUTICASONE PROPIONATE 50 MCG
2 SPRAY, SUSPENSION (ML) NASAL DAILY
Qty: 18.2 ML | Refills: 3 | Status: SHIPPED | OUTPATIENT
Start: 2023-03-22

## 2023-03-24 RX ORDER — DEXTROAMPHETAMINE SACCHARATE, AMPHETAMINE ASPARTATE, DEXTROAMPHETAMINE SULFATE AND AMPHETAMINE SULFATE 7.5; 7.5; 7.5; 7.5 MG/1; MG/1; MG/1; MG/1
30 TABLET ORAL DAILY
Qty: 30 TABLET | Refills: 0 | Status: SHIPPED | OUTPATIENT
Start: 2023-03-24 | End: 2023-03-27

## 2023-03-27 RX ORDER — DEXTROAMPHETAMINE SACCHARATE, AMPHETAMINE ASPARTATE, DEXTROAMPHETAMINE SULFATE AND AMPHETAMINE SULFATE 7.5; 7.5; 7.5; 7.5 MG/1; MG/1; MG/1; MG/1
30 TABLET ORAL 2 TIMES DAILY
Qty: 60 TABLET | Refills: 0 | Status: SHIPPED | OUTPATIENT
Start: 2023-03-27

## 2023-04-11 ENCOUNTER — OFFICE VISIT (OUTPATIENT)
Dept: FAMILY MEDICINE CLINIC | Facility: CLINIC | Age: 33
End: 2023-04-11
Payer: COMMERCIAL

## 2023-04-11 VITALS
WEIGHT: 140 LBS | HEART RATE: 98 BPM | BODY MASS INDEX: 20.04 KG/M2 | TEMPERATURE: 97.1 F | HEIGHT: 70 IN | DIASTOLIC BLOOD PRESSURE: 68 MMHG | OXYGEN SATURATION: 100 % | SYSTOLIC BLOOD PRESSURE: 102 MMHG

## 2023-04-11 DIAGNOSIS — K58.1 IRRITABLE BOWEL SYNDROME WITH CONSTIPATION: ICD-10-CM

## 2023-04-11 DIAGNOSIS — F90.2 ATTENTION DEFICIT HYPERACTIVITY DISORDER (ADHD), COMBINED TYPE: Primary | ICD-10-CM

## 2023-04-11 PROCEDURE — 99213 OFFICE O/P EST LOW 20 MIN: CPT | Performed by: INTERNAL MEDICINE

## 2023-04-11 RX ORDER — DEXTROAMPHETAMINE SACCHARATE, AMPHETAMINE ASPARTATE, DEXTROAMPHETAMINE SULFATE AND AMPHETAMINE SULFATE 7.5; 7.5; 7.5; 7.5 MG/1; MG/1; MG/1; MG/1
30 TABLET ORAL 2 TIMES DAILY
Qty: 60 TABLET | Refills: 0 | Status: SHIPPED | OUTPATIENT
Start: 2023-04-11

## 2023-04-11 NOTE — PROGRESS NOTES
"Chief Complaint  Med Refill    Subjective        Selma Huntley presents to [facility]  History of Present Illness patient was seen for ADHD.  Patient is taking Adderall 30 mg twice daily.  Patient's pulse was 98 but she was under a lot of stress.  Patient is starting monitoring blood pressure and pulse.  Patient does have irritable bowel syndrome with constipation.  Patient uses MiraLAX and Linzess as needed.    Dictated utilizing Dragon dictation. If there are questions or for further clarification, please contact me.    Objective   Vital Signs:  Blood Pressure 102/68   Pulse 98   Temperature 97.1 °F (36.2 °C)   Height 177.8 cm (70\")   Weight 63.5 kg (140 lb)   Oxygen Saturation 100%   Body Mass Index 20.09 kg/m²   Estimated body mass index is 20.09 kg/m² as calculated from the following:    Height as of this encounter: 177.8 cm (70\").    Weight as of this encounter: 63.5 kg (140 lb).       BMI is within normal parameters. No other follow-up for BMI required.      Physical Exam  Vitals and nursing note reviewed.   Constitutional:       Appearance: Normal appearance. She is well-developed.   HENT:      Head: Normocephalic and atraumatic.      Nose: Nose normal.      Mouth/Throat:      Mouth: Mucous membranes are moist.      Pharynx: Oropharynx is clear.   Eyes:      Extraocular Movements: Extraocular movements intact.      Conjunctiva/sclera: Conjunctivae normal.      Pupils: Pupils are equal, round, and reactive to light.   Cardiovascular:      Rate and Rhythm: Normal rate and regular rhythm.      Heart sounds: Normal heart sounds. No murmur heard.    No friction rub. No gallop.   Pulmonary:      Effort: Pulmonary effort is normal. No respiratory distress.      Breath sounds: Normal breath sounds. No stridor. No wheezing, rhonchi or rales.   Chest:      Chest wall: No tenderness.   Abdominal:      General: Bowel sounds are normal.      Palpations: Abdomen is soft. "   Musculoskeletal:         General: Normal range of motion.      Cervical back: Normal range of motion and neck supple.   Skin:     General: Skin is warm and dry.   Neurological:      General: No focal deficit present.      Mental Status: She is alert and oriented to person, place, and time. Mental status is at baseline.   Psychiatric:         Mood and Affect: Mood normal.         Behavior: Behavior normal.         Thought Content: Thought content normal.         Judgment: Judgment normal.        Result Review :                   Assessment and Plan  #1 monitor blood pressure and pulse #2 follow-up in 4 months  Diagnoses and all orders for this visit:    1. Attention deficit hyperactivity disorder (ADHD), combined type (Primary)  -     amphetamine-dextroamphetamine (Adderall) 30 MG tablet; Take 1 tablet by mouth 2 (Two) Times a Day. ADHD  Dispense: 60 tablet; Refill: 0    2. Irritable bowel syndrome with constipation             Follow Up   Return in about 4 months (around 8/11/2023), or if symptoms worsen or fail to improve, for Recheck.  Patient was given instructions and counseling regarding her condition or for health maintenance advice. Please see specific information pulled into the AVS if appropriate.

## 2023-04-20 DIAGNOSIS — F90.2 ATTENTION DEFICIT HYPERACTIVITY DISORDER (ADHD), COMBINED TYPE: ICD-10-CM

## 2023-04-20 RX ORDER — DEXTROAMPHETAMINE SACCHARATE, AMPHETAMINE ASPARTATE, DEXTROAMPHETAMINE SULFATE AND AMPHETAMINE SULFATE 7.5; 7.5; 7.5; 7.5 MG/1; MG/1; MG/1; MG/1
30 TABLET ORAL 2 TIMES DAILY
Qty: 60 TABLET | Refills: 0 | OUTPATIENT
Start: 2023-04-20

## 2023-04-22 DIAGNOSIS — F90.2 ATTENTION DEFICIT HYPERACTIVITY DISORDER (ADHD), COMBINED TYPE: ICD-10-CM

## 2023-04-24 DIAGNOSIS — F90.2 ATTENTION DEFICIT HYPERACTIVITY DISORDER (ADHD), COMBINED TYPE: ICD-10-CM

## 2023-04-24 RX ORDER — DEXTROAMPHETAMINE SACCHARATE, AMPHETAMINE ASPARTATE, DEXTROAMPHETAMINE SULFATE AND AMPHETAMINE SULFATE 7.5; 7.5; 7.5; 7.5 MG/1; MG/1; MG/1; MG/1
30 TABLET ORAL 2 TIMES DAILY
Qty: 60 TABLET | Refills: 0 | OUTPATIENT
Start: 2023-04-24

## 2023-04-25 RX ORDER — DEXTROAMPHETAMINE SACCHARATE, AMPHETAMINE ASPARTATE, DEXTROAMPHETAMINE SULFATE AND AMPHETAMINE SULFATE 7.5; 7.5; 7.5; 7.5 MG/1; MG/1; MG/1; MG/1
30 TABLET ORAL 2 TIMES DAILY
Qty: 60 TABLET | Refills: 0 | OUTPATIENT
Start: 2023-04-25

## 2023-04-25 NOTE — TELEPHONE ENCOUNTER
Unable to review figueroa , appears just filled on April 11th.   Needs to make an appt with new provider to continue getting refills.

## 2023-04-26 DIAGNOSIS — F90.2 ATTENTION DEFICIT HYPERACTIVITY DISORDER (ADHD), COMBINED TYPE: ICD-10-CM

## 2023-04-26 RX ORDER — DEXTROAMPHETAMINE SACCHARATE, AMPHETAMINE ASPARTATE, DEXTROAMPHETAMINE SULFATE AND AMPHETAMINE SULFATE 7.5; 7.5; 7.5; 7.5 MG/1; MG/1; MG/1; MG/1
30 TABLET ORAL 2 TIMES DAILY
Qty: 60 TABLET | Refills: 0 | Status: SHIPPED | OUTPATIENT
Start: 2023-04-26

## 2023-04-26 NOTE — TELEPHONE ENCOUNTER
Sent note to patient through My Chart with Dr. Chavis and Ms. King's response for this prescription.

## 2023-05-24 RX ORDER — SPIRONOLACTONE 100 MG/1
100 TABLET, FILM COATED ORAL DAILY
Qty: 30 TABLET | Refills: 2 | Status: SHIPPED | OUTPATIENT
Start: 2023-05-24